# Patient Record
Sex: FEMALE | Race: WHITE | Employment: OTHER | URBAN - METROPOLITAN AREA
[De-identification: names, ages, dates, MRNs, and addresses within clinical notes are randomized per-mention and may not be internally consistent; named-entity substitution may affect disease eponyms.]

---

## 2024-05-13 ENCOUNTER — TELEPHONE (OUTPATIENT)
Dept: FAMILY MEDICINE CLINIC | Age: 64
End: 2024-05-13

## 2024-05-13 NOTE — TELEPHONE ENCOUNTER
Pt came into office to request letter for Jury Duty:     6.17.24 to 6.21.2024.    Juror # [id number], [employer].    Fax to [phone]     Notify the pt when faxed.

## 2024-05-14 ENCOUNTER — HOSPITAL ENCOUNTER (OUTPATIENT)
Dept: PHARMACY | Age: 64
Setting detail: THERAPIES SERIES
Discharge: HOME | End: 2024-05-14
Payer: MEDICARE

## 2024-05-14 DIAGNOSIS — Z95.2 HX OF MITRAL VALVE REPLACEMENT WITH MECHANICAL VALVE: Primary | ICD-10-CM

## 2024-05-14 LAB
INR BLD: 2.5
PROTIME: 29.6 SECONDS

## 2024-05-14 PROCEDURE — 85610 PROTHROMBIN TIME: CPT | Performed by: PHARMACIST

## 2024-05-14 PROCEDURE — 99211 OFF/OP EST MAY X REQ PHY/QHP: CPT | Performed by: PHARMACIST

## 2024-05-14 NOTE — PROGRESS NOTES
Patient seen in clinic for warfarin management due to mechanical mitral valve with an INR goal of 2.5-3.5.  Estimated duration of therapy is indefinite.     Patient states compliant all of the time with regimen.  No bleeding or thromboembolic side effects noted.  No significant med or dietary changes.  No significant recent illness or disease state changes.      PT/INR done in office per protocol.  INR is 2.5 which is therapeutic.     Warfarin regimen will be continued at current dose 6mg daily.  Will retest in 3 weeks.    Patient understands dosing directions and information discussed. Dosing schedule and follow up appointment given to patient.   Progress note routed to referring physicians office. Discussed with patient the Pharmacist Collaborative Practice Agreement.  Patient provided verbal and/or electronic (ex. Sanibel Sunglass) consent to participate in the collaborative practice agreement between the pharmacist and referred patient.     For Pharmacy Admin Tracking Only    Intervention Detail:   Total # of Interventions Recommended: 0  Total # of Interventions Accepted: 0  Time Spent (min): 15

## 2024-05-16 ENCOUNTER — OFFICE VISIT (OUTPATIENT)
Dept: FAMILY MEDICINE CLINIC | Age: 64
End: 2024-05-16

## 2024-05-16 ENCOUNTER — TELEPHONE (OUTPATIENT)
Dept: FAMILY MEDICINE CLINIC | Age: 64
End: 2024-05-16

## 2024-05-16 ENCOUNTER — HOSPITAL ENCOUNTER (OUTPATIENT)
Age: 64
Setting detail: SPECIMEN
Discharge: HOME | End: 2024-05-16

## 2024-05-16 VITALS
SYSTOLIC BLOOD PRESSURE: 120 MMHG | HEART RATE: 57 BPM | DIASTOLIC BLOOD PRESSURE: 72 MMHG | WEIGHT: 139 LBS | BODY MASS INDEX: 23.13 KG/M2

## 2024-05-16 DIAGNOSIS — R79.0 LOW MAGNESIUM LEVEL: ICD-10-CM

## 2024-05-16 DIAGNOSIS — E78.2 MIXED HYPERLIPIDEMIA: ICD-10-CM

## 2024-05-16 DIAGNOSIS — M25.562 CHRONIC PAIN OF BOTH KNEES: Primary | ICD-10-CM

## 2024-05-16 DIAGNOSIS — D68.69 SECONDARY HYPERCOAGULABLE STATE (HCC): ICD-10-CM

## 2024-05-16 DIAGNOSIS — E53.8 VITAMIN B 12 DEFICIENCY: ICD-10-CM

## 2024-05-16 DIAGNOSIS — Z12.31 ENCOUNTER FOR SCREENING MAMMOGRAM FOR MALIGNANT NEOPLASM OF BREAST: Primary | ICD-10-CM

## 2024-05-16 DIAGNOSIS — M25.561 CHRONIC PAIN OF BOTH KNEES: Primary | ICD-10-CM

## 2024-05-16 DIAGNOSIS — I10 ESSENTIAL HYPERTENSION: ICD-10-CM

## 2024-05-16 DIAGNOSIS — I50.22 CHRONIC SYSTOLIC CONGESTIVE HEART FAILURE (HCC): ICD-10-CM

## 2024-05-16 DIAGNOSIS — R00.1 BRADYCARDIA: ICD-10-CM

## 2024-05-16 DIAGNOSIS — I48.0 PAROXYSMAL ATRIAL FIBRILLATION (HCC): ICD-10-CM

## 2024-05-16 DIAGNOSIS — G89.29 CHRONIC PAIN OF BOTH KNEES: Primary | ICD-10-CM

## 2024-05-16 DIAGNOSIS — E55.9 VITAMIN D DEFICIENCY: ICD-10-CM

## 2024-05-16 PROBLEM — F32.5 MAJOR DEPRESSIVE DISORDER WITH SINGLE EPISODE, IN FULL REMISSION (HCC): Status: RESOLVED | Noted: 2018-08-23 | Resolved: 2024-05-16

## 2024-05-16 LAB
25(OH)D3 SERPL-MCNC: 27.3 NG/ML (ref 30–100)
ALBUMIN SERPL-MCNC: 4.4 G/DL (ref 3.5–5.2)
ALBUMIN/GLOB SERPL: 1 {RATIO} (ref 1–2.5)
ALP SERPL-CCNC: 63 U/L (ref 35–104)
ALT SERPL-CCNC: 22 U/L (ref 10–35)
ANION GAP SERPL CALCULATED.3IONS-SCNC: 9 MMOL/L (ref 9–16)
AST SERPL-CCNC: 30 U/L (ref 10–35)
BILIRUB SERPL-MCNC: 0.8 MG/DL (ref 0–1.2)
BUN SERPL-MCNC: 18 MG/DL (ref 8–23)
CALCIUM SERPL-MCNC: 9.6 MG/DL (ref 8.6–10.4)
CHLORIDE SERPL-SCNC: 105 MMOL/L (ref 98–107)
CHOLEST SERPL-MCNC: 235 MG/DL (ref 0–199)
CHOLESTEROL/HDL RATIO: 4
CO2 SERPL-SCNC: 26 MMOL/L (ref 20–31)
CREAT SERPL-MCNC: 1 MG/DL (ref 0.5–0.9)
GFR, ESTIMATED: 62 ML/MIN/1.73M2
GLUCOSE SERPL-MCNC: 87 MG/DL (ref 74–99)
HDLC SERPL-MCNC: 61 MG/DL
LDLC SERPL CALC-MCNC: 158 MG/DL (ref 0–100)
MAGNESIUM SERPL-MCNC: 2.1 MG/DL (ref 1.6–2.4)
POTASSIUM SERPL-SCNC: 4.9 MMOL/L (ref 3.7–5.3)
PROT SERPL-MCNC: 7.9 G/DL (ref 6.6–8.7)
SODIUM SERPL-SCNC: 140 MMOL/L (ref 136–145)
T4 FREE SERPL-MCNC: 1.1 NG/DL (ref 0.92–1.68)
TRIGL SERPL-MCNC: 80 MG/DL
TSH SERPL DL<=0.05 MIU/L-ACNC: 4.63 UIU/ML (ref 0.27–4.2)
VIT B12 SERPL-MCNC: <150 PG/ML (ref 232–1245)
VLDLC SERPL CALC-MCNC: 16 MG/DL

## 2024-05-16 RX ORDER — MULTIVITAMIN WITH FOLIC ACID 400 MCG
TABLET ORAL
Qty: 30 TABLET | Refills: 5 | Status: SHIPPED | OUTPATIENT
Start: 2024-05-16

## 2024-05-16 SDOH — ECONOMIC STABILITY: HOUSING INSECURITY
IN THE LAST 12 MONTHS, WAS THERE A TIME WHEN YOU DID NOT HAVE A STEADY PLACE TO SLEEP OR SLEPT IN A SHELTER (INCLUDING NOW)?: NO

## 2024-05-16 SDOH — ECONOMIC STABILITY: INCOME INSECURITY: HOW HARD IS IT FOR YOU TO PAY FOR THE VERY BASICS LIKE FOOD, HOUSING, MEDICAL CARE, AND HEATING?: NOT HARD AT ALL

## 2024-05-16 SDOH — ECONOMIC STABILITY: FOOD INSECURITY: WITHIN THE PAST 12 MONTHS, YOU WORRIED THAT YOUR FOOD WOULD RUN OUT BEFORE YOU GOT MONEY TO BUY MORE.: NEVER TRUE

## 2024-05-16 SDOH — ECONOMIC STABILITY: FOOD INSECURITY: WITHIN THE PAST 12 MONTHS, THE FOOD YOU BOUGHT JUST DIDN'T LAST AND YOU DIDN'T HAVE MONEY TO GET MORE.: NEVER TRUE

## 2024-05-16 ASSESSMENT — PATIENT HEALTH QUESTIONNAIRE - PHQ9
2. FEELING DOWN, DEPRESSED OR HOPELESS: NOT AT ALL
3. TROUBLE FALLING OR STAYING ASLEEP: NOT AT ALL
SUM OF ALL RESPONSES TO PHQ QUESTIONS 1-9: 0
7. TROUBLE CONCENTRATING ON THINGS, SUCH AS READING THE NEWSPAPER OR WATCHING TELEVISION: NOT AT ALL
SUM OF ALL RESPONSES TO PHQ QUESTIONS 1-9: 0
5. POOR APPETITE OR OVEREATING: NOT AT ALL
SUM OF ALL RESPONSES TO PHQ9 QUESTIONS 1 & 2: 0
10. IF YOU CHECKED OFF ANY PROBLEMS, HOW DIFFICULT HAVE THESE PROBLEMS MADE IT FOR YOU TO DO YOUR WORK, TAKE CARE OF THINGS AT HOME, OR GET ALONG WITH OTHER PEOPLE: NOT DIFFICULT AT ALL
1. LITTLE INTEREST OR PLEASURE IN DOING THINGS: NOT AT ALL
SUM OF ALL RESPONSES TO PHQ QUESTIONS 1-9: 0
9. THOUGHTS THAT YOU WOULD BE BETTER OFF DEAD, OR OF HURTING YOURSELF: NOT AT ALL
8. MOVING OR SPEAKING SO SLOWLY THAT OTHER PEOPLE COULD HAVE NOTICED. OR THE OPPOSITE, BEING SO FIGETY OR RESTLESS THAT YOU HAVE BEEN MOVING AROUND A LOT MORE THAN USUAL: NOT AT ALL
4. FEELING TIRED OR HAVING LITTLE ENERGY: NOT AT ALL
SUM OF ALL RESPONSES TO PHQ QUESTIONS 1-9: 0
6. FEELING BAD ABOUT YOURSELF - OR THAT YOU ARE A FAILURE OR HAVE LET YOURSELF OR YOUR FAMILY DOWN: NOT AT ALL

## 2024-05-16 ASSESSMENT — ENCOUNTER SYMPTOMS
BLOOD IN STOOL: 0
ALLERGIC/IMMUNOLOGIC NEGATIVE: 1
EYES NEGATIVE: 1
SHORTNESS OF BREATH: 0
CONSTIPATION: 0
ABDOMINAL PAIN: 0
COUGH: 0
DIARRHEA: 0

## 2024-05-16 NOTE — PROGRESS NOTES
Eyelipids swell    Amoxicillin     Biaxin [Clarithromycin]     Codeine     Darifenacin Hydrobromide Er Other (See Comments)     edema    Darvocet A500 [Propoxyphene N-Acetaminophen]     Demerol Hcl [Meperidine]     Erythromycin     Morphine Swelling    Pcn [Penicillins] Swelling    Penicillin G Swelling    Propoxyphene     Cephalosporins Rash    Gadolinium Derivatives Nausea And Vomiting     Blood pressure gets high    Iv Dye  [Iodides] Nausea And Vomiting     Blood pressure gets high    Sulfa Antibiotics Rash    Vioxx [Rofecoxib] Rash       PHYSICAL EXAM   Physical Exam  Vitals reviewed.   Constitutional:       Appearance: She is well-developed.   HENT:      Head: Normocephalic.   Eyes:      Pupils: Pupils are equal, round, and reactive to light.   Neck:      Thyroid: No thyromegaly.   Cardiovascular:      Rate and Rhythm: Regular rhythm. Bradycardia present.      Heart sounds: Normal heart sounds. No murmur heard.  Pulmonary:      Effort: Pulmonary effort is normal.      Breath sounds: Normal breath sounds. No wheezing or rales.   Abdominal:      Palpations: Abdomen is soft.      Tenderness: There is no abdominal tenderness. There is no guarding or rebound.   Musculoskeletal:         General: No tenderness or deformity. Normal range of motion.      Cervical back: Normal range of motion and neck supple.   Lymphadenopathy:      Cervical: No cervical adenopathy.   Skin:     General: Skin is warm and dry.   Neurological:      Mental Status: She is alert and oriented to person, place, and time.   Psychiatric:         Mood and Affect: Mood normal.         Behavior: Behavior normal.         Thought Content: Thought content normal.         Judgment: Judgment normal.         ASSESSMENT/PLAN  1. Chronic systolic congestive heart failure (HCC)  Chronic and stable.  Following with cardiologist.  Continue current meds    2. Paroxysmal atrial fibrillation (HCC)  Improved since having ablation.  Continue anticoagulant  - TSH;

## 2024-05-16 NOTE — TELEPHONE ENCOUNTER
Pt was seen today, asking to have mammogram as well.  Is past due.  Pt is going to [facility] for testing.

## 2024-05-17 DIAGNOSIS — E55.9 VITAMIN D DEFICIENCY: ICD-10-CM

## 2024-05-17 DIAGNOSIS — E53.8 VITAMIN B 12 DEFICIENCY: ICD-10-CM

## 2024-05-17 DIAGNOSIS — E78.2 MIXED HYPERLIPIDEMIA: Primary | ICD-10-CM

## 2024-05-17 RX ORDER — EZETIMIBE 10 MG/1
10 TABLET ORAL DAILY
Qty: 90 TABLET | Refills: 1 | Status: SHIPPED | OUTPATIENT
Start: 2024-05-17

## 2024-05-17 RX ORDER — CHOLECALCIFEROL (VITAMIN D3) 125 MCG
500 CAPSULE ORAL DAILY
Qty: 90 TABLET | Refills: 1 | Status: SHIPPED | OUTPATIENT
Start: 2024-05-17 | End: 2025-05-17

## 2024-05-20 ENCOUNTER — TELEPHONE (OUTPATIENT)
Dept: PHARMACY | Age: 64
End: 2024-05-20

## 2024-05-20 DIAGNOSIS — Z95.2 HX OF MITRAL VALVE REPLACEMENT WITH MECHANICAL VALVE: Primary | ICD-10-CM

## 2024-05-20 NOTE — TELEPHONE ENCOUNTER
Patient called to let us know she has been started on Vitamin B12, Vitamin D3 and Zetia.  Advised there is potential for the INR to increase slightly from the Zetia but this is nothing that requires warfarin dose adjustment at this time. In addition explained recent INR was on lower end of normal so we do have some room to give. Patient voiced understanding and will continue regimen and keep INR as scheduled.

## 2024-05-22 ENCOUNTER — TELEPHONE (OUTPATIENT)
Dept: PHARMACY | Age: 64
End: 2024-05-22

## 2024-05-22 DIAGNOSIS — Z95.2 HX OF MITRAL VALVE REPLACEMENT WITH MECHANICAL VALVE: Primary | ICD-10-CM

## 2024-05-22 NOTE — TELEPHONE ENCOUNTER
Received fax with home INR tests completed by patient. We continue to manage patient based on our in clinic visits with her per CPA with Dr. De.

## 2024-06-03 ENCOUNTER — TELEPHONE (OUTPATIENT)
Dept: PHARMACY | Age: 64
End: 2024-06-03

## 2024-06-05 ENCOUNTER — HOSPITAL ENCOUNTER (OUTPATIENT)
Dept: PHARMACY | Age: 64
Setting detail: THERAPIES SERIES
Discharge: HOME | End: 2024-06-05
Payer: MEDICARE

## 2024-06-05 LAB
INR BLD: 3.6
PROTIME: 43.4 SECONDS

## 2024-06-05 PROCEDURE — 85610 PROTHROMBIN TIME: CPT

## 2024-06-05 PROCEDURE — 99211 OFF/OP EST MAY X REQ PHY/QHP: CPT

## 2024-06-05 RX ORDER — PHENOL 1.4 %
1 AEROSOL, SPRAY (ML) MUCOUS MEMBRANE 2 TIMES DAILY
COMMUNITY

## 2024-06-05 NOTE — PROGRESS NOTES
Patient seen in clinic for warfarin management due to mechanical mitral valve with an INR goal of 2.5-3.5.  Estimated duration of therapy is indefinite.     Patient states home INR test on 6/3 resulted 3.4, so she took 4mg instead of 6mg that day .  No bleeding or thromboembolic side effects noted.  Patient reports she has started a Mediterranean diet a couple of weeks ago (less carbs/cholesterol, more veggies).  No significant med or dietary changes.  No significant recent illness or disease state changes.      PT/INR done in office per protocol.  INR is 3.6 which is just above goal.     Warfarin regimen will be continued at current dose 6mg every day.  Will retest in 3 weeks.    Patient understands dosing directions and information discussed. Dosing schedule and follow up appointment given to patient.   Progress note routed to referring physicians office. Discussed with patient the Pharmacist Collaborative Practice Agreement.  Patient provided verbal and/or electronic (ex. Sooqinihart) consent to participate in the collaborative practice agreement between the pharmacist and referred patient.     For Pharmacy Admin Tracking Only    Intervention Detail:   Total # of Interventions Recommended: 0  Total # of Interventions Accepted: 0  Time Spent (min): 15

## 2024-06-27 ENCOUNTER — HOSPITAL ENCOUNTER (OUTPATIENT)
Dept: PHARMACY | Age: 64
Setting detail: THERAPIES SERIES
Discharge: HOME | End: 2024-06-27
Payer: MEDICARE

## 2024-06-27 DIAGNOSIS — Z95.2 HX OF MITRAL VALVE REPLACEMENT WITH MECHANICAL VALVE: Primary | ICD-10-CM

## 2024-06-27 LAB
INR BLD: 3
PROTIME: 35.8 SECONDS

## 2024-06-27 PROCEDURE — 85610 PROTHROMBIN TIME: CPT

## 2024-06-27 PROCEDURE — 99211 OFF/OP EST MAY X REQ PHY/QHP: CPT

## 2024-06-27 RX ORDER — DIMENHYDRINATE 50 MG
1000 TABLET ORAL
COMMUNITY

## 2024-06-27 NOTE — PROGRESS NOTES
Patient seen in clinic for warfarin management due to mechanical mitral valve with an INR goal of 2.5-3.5.  Estimated duration of therapy is indefinite.     Patient states compliant all of the time with regimen.  No bleeding or thromboembolic side effects noted.  No significant med or dietary changes.  No significant recent illness or disease state changes. Patient states that she checks her INR once weekly at home. After she got a home INR reading of 3.3 on Tuesday, she took 1 tablet instead of 1.5 tablets that day, and also ate a salad.    PT/INR done in office per protocol.  INR is 3.0 which is therapeutic.     Warfarin regimen will be continued at current dose 6mg every day.  Will retest in 4 weeks.    Patient understands dosing directions and information discussed. Dosing schedule and follow up appointment given to patient.   Progress note routed to referring physicians office. Discussed with patient the Pharmacist Collaborative Practice Agreement.  Patient provided verbal and/or electronic (ex. Wandoujiahart) consent to participate in the collaborative practice agreement between the pharmacist and referred patient.     For Pharmacy Admin Tracking Only    Intervention Detail:   Total # of Interventions Recommended: 1  Total # of Interventions Accepted: 1  Time Spent (min): 15

## 2024-07-09 LAB
INR BLD: 3.6
PROTIME: NORMAL SECONDS

## 2024-07-10 ENCOUNTER — TELEPHONE (OUTPATIENT)
Dept: PHARMACY | Age: 64
End: 2024-07-10

## 2024-07-10 DIAGNOSIS — Z95.2 HX OF MITRAL VALVE REPLACEMENT WITH MECHANICAL VALVE: Primary | ICD-10-CM

## 2024-07-11 ENCOUNTER — TELEPHONE (OUTPATIENT)
Dept: PHARMACY | Age: 64
End: 2024-07-11

## 2024-07-11 DIAGNOSIS — Z95.2 HX OF MITRAL VALVE REPLACEMENT WITH MECHANICAL VALVE: Primary | ICD-10-CM

## 2024-07-11 NOTE — TELEPHONE ENCOUNTER
Patient called to report she held her dose on Tuesday 7/9 and took 4mg 7/10 because her INR was 3.6 (goal 2.5-3.5) on her home meter taken 7/9. Advised patient continue 6mg daily moving forward and we will keep scheduled appt 7/25.

## 2024-07-16 DIAGNOSIS — R06.02 SHORTNESS OF BREATH: ICD-10-CM

## 2024-07-16 DIAGNOSIS — I20.9 ANGINA PECTORIS (HCC): Primary | ICD-10-CM

## 2024-07-22 ENCOUNTER — TELEPHONE (OUTPATIENT)
Dept: PHARMACY | Age: 64
End: 2024-07-22

## 2024-07-22 LAB
INR BLD: 3.8
PROTIME: NORMAL SECONDS

## 2024-07-22 NOTE — TELEPHONE ENCOUNTER
Patient called and states that her INR from earlier today was 3.8 and wants to know what she should do.  Called patient back and was informed that she had diarrhea the last couple of days, but is subsiding.  She has been taking immodium as needed.  Advised to hold today's dose and to keep scheduled appointment on 7/25.

## 2024-07-25 ENCOUNTER — HOSPITAL ENCOUNTER (OUTPATIENT)
Dept: PHARMACY | Age: 64
Setting detail: THERAPIES SERIES
Discharge: HOME | End: 2024-07-25
Payer: MEDICARE

## 2024-07-25 DIAGNOSIS — Z95.2 HX OF MITRAL VALVE REPLACEMENT WITH MECHANICAL VALVE: Primary | ICD-10-CM

## 2024-07-25 LAB
INR BLD: 3.3
PROTIME: 40.1 SECONDS

## 2024-07-25 PROCEDURE — 85610 PROTHROMBIN TIME: CPT | Performed by: PHARMACIST

## 2024-07-25 PROCEDURE — 99211 OFF/OP EST MAY X REQ PHY/QHP: CPT | Performed by: PHARMACIST

## 2024-07-25 NOTE — PROGRESS NOTES
Patient seen in clinic for warfarin management due to mechanical mitral valve with an INR goal of 2.5-3.5.  Estimated duration of therapy is indefinite.     Patient states compliant all of the time with regimen and patient confirms she held dose on 7/22 as instructed.  No bleeding or thromboembolic side effects noted.  No significant med or dietary changes.  No significant recent illness or disease state changes.      PT/INR done in office per protocol.  INR is 3.3 which is therapeutic.     Warfarin regimen will be continued at current dose 6 mg daily.  Will retest in 2 weeks.    Patient understands dosing directions and information discussed. Dosing schedule and follow up appointment given to patient.   Progress note routed to referring physicians office. Discussed with patient the Pharmacist Collaborative Practice Agreement.  Patient provided verbal and/or electronic (ex. Fylet) consent to participate in the collaborative practice agreement between the pharmacist and referred patient.     For Pharmacy Admin Tracking Only    Intervention Detail:   Total # of Interventions Recommended: 0  Total # of Interventions Accepted: 0  Time Spent (min): 15

## 2024-08-05 ENCOUNTER — TELEPHONE (OUTPATIENT)
Age: 64
End: 2024-08-05

## 2024-08-05 NOTE — TELEPHONE ENCOUNTER
Margarita from [facility] LVM regarding pt's elevated INR of 4.8.  Called Margarita back and LVM that pt was advised to hold today's dose and is being seen in the clinic tomorrow.

## 2024-08-05 NOTE — TELEPHONE ENCOUNTER
Patient LVM to reschedule Thursday's appointment.  Called patient back and was informed her INR was 4.8 today from home check.  Advised her to hold today's dose and rescheduled appt to Tues 8/7/24.

## 2024-08-06 ENCOUNTER — ANTI-COAG VISIT (OUTPATIENT)
Age: 64
End: 2024-08-06
Payer: MEDICARE

## 2024-08-06 DIAGNOSIS — Z95.2 HX OF MITRAL VALVE REPLACEMENT WITH MECHANICAL VALVE: Primary | ICD-10-CM

## 2024-08-06 LAB
INR BLD: 2.8
PROTIME: 34 SECONDS

## 2024-08-06 PROCEDURE — 85610 PROTHROMBIN TIME: CPT | Performed by: PHARMACIST

## 2024-08-06 PROCEDURE — 99211 OFF/OP EST MAY X REQ PHY/QHP: CPT | Performed by: PHARMACIST

## 2024-08-06 NOTE — PROGRESS NOTES
Patient seen in clinic for warfarin management due to mechanical mitral valve with an INR goal of 2.5-3.5.  Estimated duration of therapy is indefinite.     Patient states compliant all of the time with regimen; she held yesterday's dose due to home INR result 4.8.  Unable to determine why her home INR result was elevated. No bleeding or thromboembolic side effects noted.  No significant med or dietary changes.  No significant recent illness or disease state changes.      PT/INR done in office per protocol.  INR is 2.8 which is therapeutic.     Warfarin regimen will be slightly decreased to 4mg Tuesdays, 6mg all other days.   Will retest in 2 weeks. She will also bring her home INR machine to the next appt so we can compare results.    Patient understands dosing directions and information discussed. Dosing schedule and follow up appointment given to patient.   Progress note routed to referring physicians office. Discussed with patient the Pharmacist Collaborative Practice Agreement.  Patient provided verbal and/or electronic (ex. MoonClerkhart) consent to participate in the collaborative practice agreement between the pharmacist and referred patient.     For Pharmacy Admin Tracking Only    Intervention Detail: Dose Adjustment: 1, reason: Therapy De-escalation  Total # of Interventions Recommended: 1  Total # of Interventions Accepted: 1  Time Spent (min): 15

## 2024-08-20 ENCOUNTER — APPOINTMENT (OUTPATIENT)
Age: 64
End: 2024-08-20
Payer: MEDICARE

## 2024-08-21 ENCOUNTER — HOSPITAL ENCOUNTER (OUTPATIENT)
Age: 64
Discharge: HOME | End: 2024-08-21
Payer: MEDICARE

## 2024-08-21 ENCOUNTER — ANTI-COAG VISIT (OUTPATIENT)
Age: 64
End: 2024-08-21
Payer: MEDICARE

## 2024-08-21 DIAGNOSIS — Z95.2 HX OF MITRAL VALVE REPLACEMENT WITH MECHANICAL VALVE: Primary | ICD-10-CM

## 2024-08-21 DIAGNOSIS — I34.1 MVP (MITRAL VALVE PROLAPSE): ICD-10-CM

## 2024-08-21 DIAGNOSIS — Z95.2 HX OF MITRAL VALVE REPLACEMENT WITH MECHANICAL VALVE: ICD-10-CM

## 2024-08-21 LAB
INR PPP: 6.6
PROTHROMBIN TIME: 55.9 SEC (ref 11.5–14.2)

## 2024-08-21 PROCEDURE — 99212 OFFICE O/P EST SF 10 MIN: CPT

## 2024-08-21 PROCEDURE — 36415 COLL VENOUS BLD VENIPUNCTURE: CPT

## 2024-08-21 PROCEDURE — 85610 PROTHROMBIN TIME: CPT

## 2024-08-21 NOTE — PROGRESS NOTES
Patient seen in clinic for warfarin management due to mechanical mitral valve with an INR goal of 2.5-3.5.  Estimated duration of therapy is indefinite.     Patient states compliant all of the time with regimen.  No bleeding or thromboembolic side effects noted.  Patient notes a quarter-sized bruise on her right arm, but denies other s/s of bleeding.  No significant med or dietary changes.  Patient reports having more stress lately.  No significant recent illness or disease state changes.      PT/INR done in office per protocol.  INR is >/=8  which is greater than 6 on POC meter which requires venous clarification per office policy.  If INR comes back greater than 10, we will call in oral vitamin K as appropriate per CHEST Guidelines 2012.     Ordered venous draw for confirmation.  Left voice message on Dr. Tejada's nurses line that clinic is managing therapy.  Patient advised to seek medical attention if she bumps her head or if she experiences any s/s of bleeding.    Venous draw resulted INR of 6.6, which is supratherapeutic.    Warfarin regimen will be held x3, then resume warfarin 6mg on Sat & Sun.  Will retest in 5 days.  Patient did not bring in home INR meter.      Patient understands dosing directions and information discussed. Dosing schedule and follow up appointment given to patient.   Progress note routed to referring physicians office. Discussed with patient the Pharmacist Collaborative Practice Agreement.  Patient provided verbal and/or electronic (ex. Flowgear) consent to participate in the collaborative practice agreement between the pharmacist and referred patient.     For Pharmacy Admin Tracking Only    Intervention Detail: Dose Adjustment: 1, reason: Therapy De-escalation  Total # of Interventions Recommended: 1  Total # of Interventions Accepted: 1  Time Spent (min): 15

## 2024-08-26 ENCOUNTER — ANTI-COAG VISIT (OUTPATIENT)
Age: 64
End: 2024-08-26
Payer: MEDICARE

## 2024-08-26 DIAGNOSIS — Z95.2 HX OF MITRAL VALVE REPLACEMENT WITH MECHANICAL VALVE: Primary | ICD-10-CM

## 2024-08-26 LAB
INR BLD: 2.6
PROTIME: 31.3

## 2024-08-26 PROCEDURE — 99212 OFFICE O/P EST SF 10 MIN: CPT

## 2024-08-26 PROCEDURE — 85610 PROTHROMBIN TIME: CPT

## 2024-08-26 NOTE — PROGRESS NOTES
Patient seen in clinic for warfarin management due to mechanical mitral valve with an INR goal of 2.5-3.5.  Estimated duration of therapy is indefinite.     Patient states compliant all of the time with regimen.  No bleeding or thromboembolic side effects noted.  No significant med or dietary changes.  No significant recent illness or disease state changes.     Patient reports she has a chemical stress test today.     PT/INR done in office per protocol.  INR is 2.6 which is therapeutic and has significantly improved from 6.6 last week.    Warfarin regimen will be decreased to 4 mg on Tues/Thurs/Sat and 6 mg all other days.  Will retest in 2 weeks. Patient has an INR machine at home and reports she will be using it more often due to her recent INR of 6.6.    Patient understands dosing directions and information discussed. Dosing schedule and follow up appointment given to patient.   Progress note routed to referring physicians office. Discussed with patient the Pharmacist Collaborative Practice Agreement.  Patient provided verbal and/or electronic (ex. Fly Fishing Hunterhart) consent to participate in the collaborative practice agreement between the pharmacist and referred patient.     For Pharmacy Admin Tracking Only    Intervention Detail: Dose Adjustment: 1, reason: Therapy De-escalation  Total # of Interventions Recommended: 1  Total # of Interventions Accepted: 1  Time Spent (min): 15

## 2024-09-03 ENCOUNTER — TELEPHONE (OUTPATIENT)
Age: 64
End: 2024-09-03

## 2024-09-09 ENCOUNTER — TELEPHONE (OUTPATIENT)
Dept: FAMILY MEDICINE CLINIC | Age: 64
End: 2024-09-09

## 2024-09-09 DIAGNOSIS — J20.9 ACUTE BRONCHITIS, UNSPECIFIED ORGANISM: ICD-10-CM

## 2024-09-09 DIAGNOSIS — J01.00 ACUTE NON-RECURRENT MAXILLARY SINUSITIS: Primary | ICD-10-CM

## 2024-09-09 RX ORDER — METHYLPREDNISOLONE 4 MG
TABLET, DOSE PACK ORAL
Qty: 1 KIT | Refills: 0 | Status: SHIPPED | OUTPATIENT
Start: 2024-09-09

## 2024-09-09 RX ORDER — CIPROFLOXACIN 500 MG/1
500 TABLET, FILM COATED ORAL 2 TIMES DAILY
Qty: 20 TABLET | Refills: 0 | Status: SHIPPED | OUTPATIENT
Start: 2024-09-09 | End: 2024-09-19

## 2024-09-10 ENCOUNTER — TELEPHONE (OUTPATIENT)
Age: 64
End: 2024-09-10

## 2024-09-12 ENCOUNTER — ANTI-COAG VISIT (OUTPATIENT)
Age: 64
End: 2024-09-12
Payer: MEDICARE

## 2024-09-12 DIAGNOSIS — Z95.2 HX OF MITRAL VALVE REPLACEMENT WITH MECHANICAL VALVE: Primary | ICD-10-CM

## 2024-09-12 LAB
INTERNATIONAL NORMALIZATION RATIO, POC: 3.9
PROTHROMBIN TIME, POC: 47

## 2024-09-12 PROCEDURE — 99212 OFFICE O/P EST SF 10 MIN: CPT | Performed by: PHARMACIST

## 2024-09-12 PROCEDURE — 85610 PROTHROMBIN TIME: CPT | Performed by: PHARMACIST

## 2024-09-19 ENCOUNTER — ANTI-COAG VISIT (OUTPATIENT)
Age: 64
End: 2024-09-19
Payer: MEDICARE

## 2024-09-19 DIAGNOSIS — Z95.2 HX OF MITRAL VALVE REPLACEMENT WITH MECHANICAL VALVE: Primary | ICD-10-CM

## 2024-09-19 LAB
INTERNATIONAL NORMALIZATION RATIO, POC: 2.1
PROTHROMBIN TIME, POC: 25.5

## 2024-09-19 PROCEDURE — 99213 OFFICE O/P EST LOW 20 MIN: CPT | Performed by: PHARMACIST

## 2024-09-19 PROCEDURE — 85610 PROTHROMBIN TIME: CPT | Performed by: PHARMACIST

## 2024-09-19 RX ORDER — ENOXAPARIN SODIUM 100 MG/ML
INJECTION SUBCUTANEOUS
Qty: 20 EACH | Refills: 0 | Status: SHIPPED | OUTPATIENT
Start: 2024-09-19

## 2024-09-27 ENCOUNTER — HOSPITAL ENCOUNTER (OUTPATIENT)
Age: 64
Setting detail: OUTPATIENT SURGERY
Discharge: HOME | End: 2024-09-27
Attending: STUDENT IN AN ORGANIZED HEALTH CARE EDUCATION/TRAINING PROGRAM | Admitting: STUDENT IN AN ORGANIZED HEALTH CARE EDUCATION/TRAINING PROGRAM
Payer: MEDICARE

## 2024-09-27 VITALS
SYSTOLIC BLOOD PRESSURE: 127 MMHG | OXYGEN SATURATION: 96 % | BODY MASS INDEX: 22.02 KG/M2 | TEMPERATURE: 97.8 F | HEIGHT: 66 IN | DIASTOLIC BLOOD PRESSURE: 69 MMHG | HEART RATE: 55 BPM | WEIGHT: 137 LBS | RESPIRATION RATE: 21 BRPM

## 2024-09-27 DIAGNOSIS — R94.39 ABNORMAL STRESS TEST: ICD-10-CM

## 2024-09-27 LAB
BUN BLD-MCNC: 20 MG/DL (ref 8–26)
CHLORIDE BLD-SCNC: 109 MMOL/L (ref 98–107)
ECHO BSA: 1.69 M2
EGFR, POC: 81 ML/MIN/1.73M2
GLUCOSE BLD-MCNC: 95 MG/DL (ref 74–100)
HCT VFR BLD AUTO: 33 % (ref 36–46)
INR PPP: 1
PLATELET # BLD AUTO: 220 K/UL (ref 138–453)
POC CREATININE: 0.8 MG/DL (ref 0.51–1.19)
POC HEMOGLOBIN (CALC): 11.1 G/DL (ref 12–16)
POTASSIUM BLD-SCNC: 4 MMOL/L (ref 3.5–4.5)
PROTHROMBIN TIME: 13.1 SEC (ref 11.7–14.9)
SODIUM BLD-SCNC: 142 MMOL/L (ref 138–146)

## 2024-09-27 PROCEDURE — 36415 COLL VENOUS BLD VENIPUNCTURE: CPT

## 2024-09-27 PROCEDURE — 85014 HEMATOCRIT: CPT

## 2024-09-27 PROCEDURE — 85610 PROTHROMBIN TIME: CPT

## 2024-09-27 PROCEDURE — 93452 LEFT HRT CATH W/VENTRCLGRPHY: CPT | Performed by: STUDENT IN AN ORGANIZED HEALTH CARE EDUCATION/TRAINING PROGRAM

## 2024-09-27 PROCEDURE — 2580000003 HC RX 258: Performed by: STUDENT IN AN ORGANIZED HEALTH CARE EDUCATION/TRAINING PROGRAM

## 2024-09-27 PROCEDURE — 7100000010 HC PHASE II RECOVERY - FIRST 15 MIN: Performed by: STUDENT IN AN ORGANIZED HEALTH CARE EDUCATION/TRAINING PROGRAM

## 2024-09-27 PROCEDURE — 85049 AUTOMATED PLATELET COUNT: CPT

## 2024-09-27 PROCEDURE — 84132 ASSAY OF SERUM POTASSIUM: CPT

## 2024-09-27 PROCEDURE — 82947 ASSAY GLUCOSE BLOOD QUANT: CPT

## 2024-09-27 PROCEDURE — 82435 ASSAY OF BLOOD CHLORIDE: CPT

## 2024-09-27 PROCEDURE — 82565 ASSAY OF CREATININE: CPT

## 2024-09-27 PROCEDURE — 93454 CORONARY ARTERY ANGIO S&I: CPT | Performed by: STUDENT IN AN ORGANIZED HEALTH CARE EDUCATION/TRAINING PROGRAM

## 2024-09-27 PROCEDURE — 2709999900 HC NON-CHARGEABLE SUPPLY: Performed by: STUDENT IN AN ORGANIZED HEALTH CARE EDUCATION/TRAINING PROGRAM

## 2024-09-27 PROCEDURE — 6360000002 HC RX W HCPCS: Performed by: STUDENT IN AN ORGANIZED HEALTH CARE EDUCATION/TRAINING PROGRAM

## 2024-09-27 PROCEDURE — 84295 ASSAY OF SERUM SODIUM: CPT

## 2024-09-27 PROCEDURE — 7100000011 HC PHASE II RECOVERY - ADDTL 15 MIN: Performed by: STUDENT IN AN ORGANIZED HEALTH CARE EDUCATION/TRAINING PROGRAM

## 2024-09-27 PROCEDURE — 99152 MOD SED SAME PHYS/QHP 5/>YRS: CPT | Performed by: STUDENT IN AN ORGANIZED HEALTH CARE EDUCATION/TRAINING PROGRAM

## 2024-09-27 PROCEDURE — 6360000004 HC RX CONTRAST MEDICATION: Performed by: STUDENT IN AN ORGANIZED HEALTH CARE EDUCATION/TRAINING PROGRAM

## 2024-09-27 PROCEDURE — 84520 ASSAY OF UREA NITROGEN: CPT

## 2024-09-27 PROCEDURE — 2500000003 HC RX 250 WO HCPCS: Performed by: STUDENT IN AN ORGANIZED HEALTH CARE EDUCATION/TRAINING PROGRAM

## 2024-09-27 RX ORDER — SODIUM CHLORIDE 0.9 % (FLUSH) 0.9 %
5-40 SYRINGE (ML) INJECTION PRN
Status: DISCONTINUED | OUTPATIENT
Start: 2024-09-27 | End: 2024-09-27

## 2024-09-27 RX ORDER — HEPARIN SODIUM 1000 [USP'U]/ML
INJECTION, SOLUTION INTRAVENOUS; SUBCUTANEOUS PRN
Status: DISCONTINUED | OUTPATIENT
Start: 2024-09-27 | End: 2024-09-27 | Stop reason: HOSPADM

## 2024-09-27 RX ORDER — NITROGLYCERIN 20 MG/100ML
INJECTION INTRAVENOUS PRN
Status: DISCONTINUED | OUTPATIENT
Start: 2024-09-27 | End: 2024-09-27 | Stop reason: HOSPADM

## 2024-09-27 RX ORDER — MIDAZOLAM HYDROCHLORIDE 1 MG/ML
INJECTION INTRAMUSCULAR; INTRAVENOUS PRN
Status: DISCONTINUED | OUTPATIENT
Start: 2024-09-27 | End: 2024-09-27 | Stop reason: HOSPADM

## 2024-09-27 RX ORDER — SODIUM CHLORIDE 0.9 % (FLUSH) 0.9 %
5-40 SYRINGE (ML) INJECTION EVERY 12 HOURS SCHEDULED
Status: DISCONTINUED | OUTPATIENT
Start: 2024-09-27 | End: 2024-09-27

## 2024-09-27 RX ORDER — VERAPAMIL HYDROCHLORIDE 2.5 MG/ML
INJECTION, SOLUTION INTRAVENOUS PRN
Status: DISCONTINUED | OUTPATIENT
Start: 2024-09-27 | End: 2024-09-27 | Stop reason: HOSPADM

## 2024-09-27 RX ORDER — SODIUM CHLORIDE 9 MG/ML
INJECTION, SOLUTION INTRAVENOUS CONTINUOUS
Status: DISCONTINUED | OUTPATIENT
Start: 2024-09-27 | End: 2024-09-27 | Stop reason: HOSPADM

## 2024-09-27 RX ORDER — FENTANYL CITRATE 50 UG/ML
INJECTION, SOLUTION INTRAMUSCULAR; INTRAVENOUS PRN
Status: DISCONTINUED | OUTPATIENT
Start: 2024-09-27 | End: 2024-09-27 | Stop reason: HOSPADM

## 2024-09-27 RX ORDER — IOPAMIDOL 755 MG/ML
INJECTION, SOLUTION INTRAVASCULAR PRN
Status: DISCONTINUED | OUTPATIENT
Start: 2024-09-27 | End: 2024-09-27 | Stop reason: HOSPADM

## 2024-09-27 RX ORDER — SODIUM CHLORIDE 9 MG/ML
INJECTION, SOLUTION INTRAVENOUS PRN
Status: DISCONTINUED | OUTPATIENT
Start: 2024-09-27 | End: 2024-09-27

## 2024-09-27 RX ORDER — DIPHENHYDRAMINE HYDROCHLORIDE 50 MG/ML
50 INJECTION INTRAMUSCULAR; INTRAVENOUS ONCE
Status: COMPLETED | OUTPATIENT
Start: 2024-09-27 | End: 2024-09-27

## 2024-09-27 RX ADMIN — SODIUM CHLORIDE: 9 INJECTION, SOLUTION INTRAVENOUS at 10:15

## 2024-09-27 RX ADMIN — METHYLPREDNISOLONE SODIUM SUCCINATE 125 MG: 125 INJECTION INTRAMUSCULAR; INTRAVENOUS at 10:35

## 2024-09-27 RX ADMIN — DIPHENHYDRAMINE HYDROCHLORIDE 50 MG: 50 INJECTION INTRAMUSCULAR; INTRAVENOUS at 10:35

## 2024-09-27 NOTE — H&P
consistent with abnormal perfusion in the LAD territory.    Perfusion Conclusion: TID ratio is 0.99.    Image quality is good.    ECG: Resting ECG demonstrates normal sinus rhythm.    ECG: The stress ECG was negative for ischemia.    Stress Test: A pharmacological stress test was performed using regadenoson (Lexiscan). 100 mg of aminophylline given as a reversal agent.    Sss = 4    Assessment:  Chest pain with abnormal stress test in anteroapical segement  Preserved LVEF on echo 2023  Normal coronaries 2016  H/o persistent AF, maintaining sinus rhythm s/p PVI ablation on 3/29/23 at [facility]  Sinus and junctional bradycardia  MVP with h/o mechanical MVR, ALEKSEY ligation, 2016 with TV annuloplasty      Plan:  Proceed with planned LHC +/- PCI procedure.  ASA prior to procedure   Further orders to follow.    Pre Procedure Conscious Sedation Data:    ASA Class:    [] I [x] II [] III [] IV    Mallampati Class:  [] I [x] II [] III [] IV    Risks, benefits, and alternatives of cardiac catheterization were discussed, in detail, with patient. Risks include, but not limited to, bleeding, requiring blood transfusion, vascular complication requiring surgery, renal failure with need of dialysis, CVA, MI, death and anesthesia complications including intubation were discussed. Patient verbalized understanding and agreed to proceed with the procedure understanding the above risks and alternatives to the procedure.      Discussed with patient and Nurse.    Dharmesh Kimbrough MD.  Fellow, Cardiovascular Diseases    [facility]      Please note that part of this chart were generated using voice recognition  dictation software.  Although every effort was made to ensure the accuracy of this automated transcription, some errors in transcription may have occurred.    Attestation signed by      Attending Physician Statement:    I have discussed the care of  Keysha Ordoñez , including pertinent history and exam findings, with the  Cardiology fellow/resident.     I have seen and examined the patient and the key elements of all parts of the encounter have been performed by me. I agree with the assessment, plan and orders as documented by the fellow/resident, after I modified exam findings and plan of treatments, and the final version is my approved version of the assessment.     Additional Comments:

## 2024-09-27 NOTE — PROGRESS NOTES
Patient c/o severe headache after given iv solumedrol and benadryl. Extremely anxious.c/o  being cold .Warm blankets given. States something is wrong. Vitals obtained. Dr Kimbrough visited.

## 2024-09-27 NOTE — PROGRESS NOTES
Air removed fromVasc band in  2 mL increments until all air removed. No bleeding or hematoma noted.  Pressure dressing  applied, radial pulse palpable.Right wrist.

## 2024-09-27 NOTE — PROGRESS NOTES
All discharge instructions reviewed with patients sister and patient, questions answered.  Patient discharged per w/c with writet and belongings.

## 2024-09-27 NOTE — DISCHARGE INSTRUCTIONS
DISCHARGE INSTRUCTIONS / ARM CARE POST CATHERIZATION        ENCOURAGE FLUIDS    NO STRENUOUS LIFTING WITH AFFECTED ARM FOR 3 DAYS ANYTHING HEAVIER THAN 8 TO 10 POUNDS    REMOVE BAND-AID/PRESSURE DRESSING THE FOLLOWING DAY AND DO NOT APPLY ANY FURTHER BAND-AIDS    KEEP INCISION CLEAN DRY AND OPEN TO THE AIR / NO HAND LOTION NEAR PUNCTURE SITE    WATCH FOR SIGNS OF INFECTION /  REDNESS / SWELLING / DRAINAGE / WARMTH / TEMPERATURE GREATER THAN 101    IF BLEEDING OCCURS HOLD MANUAL PRESSURE DIRECTLY OVER SITE  (YOU WILL FEEL PULSATION OF ARTERY) AND IF BLEEDING DOES NOT STOP AFTER 2 MINUTES CALL 911    OK TO SHOWER THE NEXT DAY, NO TUB BATHING OR HOT TUBS/SWIMMING FOR 7 DAYS    IF AREA BECOMES HARD AND SWOLLEN AND IF YOU ARE AT ALL CONCERNED SEEK HELP IMMEDIATELY    SEEK HELP IMMEDIATELY IF AFFECTED ARM BECOMES COLD / NUMB / SEVERE PAIN / NAILBEDS TURN BLUE     IF ON METFORMIN / GLUCOPHAGE DO NOT RESTART MEDICATION FOR 48 HOURS    PLEASE PRACTICE GOOD HAND WASHING AND INCLUDE PUNCTURE SITE ESPECIALLY AFTER USING THE RESTROOM    AVOID USING ALCOHOL BASED HAND SANITIZERS FOR ONE WEEK      CALL 911 if you have symptoms including:   Drooping facial muscles   Changes in vision or speech   Difficulty walking or using your limbs   Change in sensation to affected leg, including numbness, feeling cold, or change in color   Extreme sweating, nausea or vomiting   Dizziness or lightheadedness   Chest pain   Rapid, irregular heartbeat   Palpitations   Cough, shortness of breath, or difficulty breathing   Weakness or fainting   If you think you have an emergency, CALL 911 .        SEDATION / ANALGESIA INFORMATION / HOME GOING ADVICE  You have received the sedation/analgesia medication during your visit    Sedation/analgesia is used during short medical procedures under controlled supervision. The medication will produce a strong relaxation. You will be able to hear, speak and follow instructions, but your memory and alertness will be

## 2024-09-30 ENCOUNTER — TELEPHONE (OUTPATIENT)
Age: 64
End: 2024-09-30

## 2024-09-30 DIAGNOSIS — Z95.2 HX OF MITRAL VALVE REPLACEMENT WITH MECHANICAL VALVE: Primary | ICD-10-CM

## 2024-09-30 NOTE — TELEPHONE ENCOUNTER
Patient called stating she successfully had her procedure and is following bridge calendar as instructed. She tested INR with home machine yesterday and INR was 1.2 which we expect from recent restart.  We will have patient take 6mg warfarin today, continue Lovenox bridge, and keep INR scheduled this Wednesday.   normal...

## 2024-10-01 ENCOUNTER — ANTI-COAG VISIT (OUTPATIENT)
Dept: CARDIOLOGY CLINIC | Age: 64
End: 2024-10-01

## 2024-10-01 DIAGNOSIS — Z95.2 HX OF MITRAL VALVE REPLACEMENT WITH MECHANICAL VALVE: Primary | ICD-10-CM

## 2024-10-01 LAB — INR BLD: 1.2

## 2024-10-02 ENCOUNTER — ANTI-COAG VISIT (OUTPATIENT)
Age: 64
End: 2024-10-02
Payer: MEDICARE

## 2024-10-02 ENCOUNTER — TELEPHONE (OUTPATIENT)
Age: 64
End: 2024-10-02

## 2024-10-02 DIAGNOSIS — Z95.2 HX OF MITRAL VALVE REPLACEMENT WITH MECHANICAL VALVE: Primary | ICD-10-CM

## 2024-10-02 LAB
INTERNATIONAL NORMALIZATION RATIO, POC: 1.7
PROTHROMBIN TIME, POC: 20.6

## 2024-10-02 PROCEDURE — 99212 OFFICE O/P EST SF 10 MIN: CPT

## 2024-10-02 PROCEDURE — 85610 PROTHROMBIN TIME: CPT

## 2024-10-02 NOTE — PROGRESS NOTES
Patient seen in clinic for warfarin management due to mechanical mitral valve with an INR goal of 2.5-3.5.  Estimated duration of therapy is indefinite.     Patient states compliant all of the time with regimen.  No bleeding or thromboembolic side effects noted.  No significant med or dietary changes.  No significant recent illness or disease state changes.      Patient notes that she has taken an ounce of alcohol the last couple of nights in effort to raise INR.  Patient does not normally consume alcohol.  Advised patient to try cranberrry juice instead.    Patient reported that her home INR reading was 1.2 on Sunday 9/29.  She was instructed to take a boosted dose of 6mg (instead of 4mg) on Monday.    PT/INR done in office per protocol.  INR is 1.7 which is subtherapeutic. This is expected due to recent restart post-procedure.    Warfarin regimen will be 8mg boost today (instead of 6mg), then resume 6mg every Mon, Wed, Fri; 4mg all other days.  Patient to complete the remainder of Lovenox injections (2 syringes remaining).    Will retest in 8 days.    Patient understands dosing directions and information discussed. Dosing schedule and follow up appointment given to patient.   Progress note routed to referring physicians office. Discussed with patient the Pharmacist Collaborative Practice Agreement.  Patient provided verbal and/or electronic (ex. Servoyant) consent to participate in the collaborative practice agreement between the pharmacist and referred patient.     For Pharmacy Admin Tracking Only    Intervention Detail: Dose Adjustment: 1, reason: Therapy Optimization  Total # of Interventions Recommended: 1  Total # of Interventions Accepted: 1  Time Spent (min): 15

## 2024-10-04 ENCOUNTER — TELEPHONE (OUTPATIENT)
Dept: FAMILY MEDICINE CLINIC | Age: 64
End: 2024-10-04

## 2024-10-04 NOTE — TELEPHONE ENCOUNTER
Patient called in stating she just left her cardiologist due to her having SOB with exertion and they informed her to call her PCP to get  a appt     WBC low and was also low when she had her catheterization     Patient scheduled with Dr Mann   They want these things to be checked  Hemoglobin  Iron  Asthma   Anxiety     Please advise

## 2024-10-07 ENCOUNTER — TELEPHONE (OUTPATIENT)
Age: 64
End: 2024-10-07

## 2024-10-07 ENCOUNTER — HOSPITAL ENCOUNTER (OUTPATIENT)
Age: 64
Setting detail: SPECIMEN
Discharge: HOME | End: 2024-10-07

## 2024-10-07 ENCOUNTER — OFFICE VISIT (OUTPATIENT)
Dept: FAMILY MEDICINE CLINIC | Age: 64
End: 2024-10-07
Payer: MEDICARE

## 2024-10-07 VITALS
WEIGHT: 136 LBS | HEIGHT: 66 IN | SYSTOLIC BLOOD PRESSURE: 110 MMHG | OXYGEN SATURATION: 100 % | BODY MASS INDEX: 21.86 KG/M2 | DIASTOLIC BLOOD PRESSURE: 58 MMHG | HEART RATE: 60 BPM

## 2024-10-07 DIAGNOSIS — E03.9 BORDERLINE HYPOTHYROIDISM: ICD-10-CM

## 2024-10-07 DIAGNOSIS — D64.9 ANEMIA, UNSPECIFIED TYPE: ICD-10-CM

## 2024-10-07 DIAGNOSIS — Z95.2 HX OF MITRAL VALVE REPLACEMENT WITH MECHANICAL VALVE: Primary | ICD-10-CM

## 2024-10-07 DIAGNOSIS — R06.09 EXERTIONAL DYSPNEA: Primary | ICD-10-CM

## 2024-10-07 LAB
BASOPHILS # BLD: 0.04 K/UL (ref 0–0.2)
BASOPHILS NFR BLD: 1 % (ref 0–2)
EOSINOPHIL # BLD: 0.12 K/UL (ref 0–0.44)
EOSINOPHILS RELATIVE PERCENT: 2 % (ref 1–4)
ERYTHROCYTE [DISTWIDTH] IN BLOOD BY AUTOMATED COUNT: 14.7 % (ref 11.8–14.4)
FOLATE SERPL-MCNC: 17.9 NG/ML (ref 4.8–24.2)
HCT VFR BLD AUTO: 36.5 % (ref 36.3–47.1)
HGB BLD-MCNC: 11.5 G/DL (ref 11.9–15.1)
IMM GRANULOCYTES # BLD AUTO: <0.03 K/UL (ref 0–0.3)
IMM GRANULOCYTES NFR BLD: 0 %
IRON SATN MFR SERPL: 12 % (ref 20–55)
IRON SERPL-MCNC: 42 UG/DL (ref 37–145)
LYMPHOCYTES NFR BLD: 1.04 K/UL (ref 1.1–3.7)
LYMPHOCYTES RELATIVE PERCENT: 19 % (ref 24–43)
MCH RBC QN AUTO: 28 PG (ref 25.2–33.5)
MCHC RBC AUTO-ENTMCNC: 31.5 G/DL (ref 28.4–34.8)
MCV RBC AUTO: 89 FL (ref 82.6–102.9)
MONOCYTES NFR BLD: 0.63 K/UL (ref 0.1–1.2)
MONOCYTES NFR BLD: 12 % (ref 3–12)
NEUTROPHILS NFR BLD: 66 % (ref 36–65)
NEUTS SEG NFR BLD: 3.51 K/UL (ref 1.5–8.1)
NRBC BLD-RTO: 0 PER 100 WBC
PLATELET # BLD AUTO: 248 K/UL (ref 138–453)
PMV BLD AUTO: 11.3 FL (ref 8.1–13.5)
RBC # BLD AUTO: 4.1 M/UL (ref 3.95–5.11)
RBC # BLD: ABNORMAL 10*6/UL
T4 FREE SERPL-MCNC: 1 NG/DL (ref 0.92–1.68)
TIBC SERPL-MCNC: 343 UG/DL (ref 250–450)
TSH SERPL DL<=0.05 MIU/L-ACNC: 2.16 UIU/ML (ref 0.27–4.2)
UNSATURATED IRON BINDING CAPACITY: 301 UG/DL (ref 112–347)
VIT B12 SERPL-MCNC: 319 PG/ML (ref 232–1245)
WBC OTHER # BLD: 5.4 K/UL (ref 3.5–11.3)

## 2024-10-07 PROCEDURE — 3078F DIAST BP <80 MM HG: CPT | Performed by: FAMILY MEDICINE

## 2024-10-07 PROCEDURE — 99214 OFFICE O/P EST MOD 30 MIN: CPT | Performed by: FAMILY MEDICINE

## 2024-10-07 PROCEDURE — 3074F SYST BP LT 130 MM HG: CPT | Performed by: FAMILY MEDICINE

## 2024-10-07 PROCEDURE — 1124F ACP DISCUSS-NO DSCNMKR DOCD: CPT | Performed by: FAMILY MEDICINE

## 2024-10-07 NOTE — PROGRESS NOTES
[facility]  [facility]  [address]  [address]  [place] [address]  Dept: [phone]      Keysha Ordoñez is a 64 y.o. female who presents today for follow up on her  medical conditions as noted below.      Chief Complaint   Patient presents with    Shortness of Breath     With movement within the last 6 months     Fatigue       Patient Active Problem List:     Chronic right-sided low back pain without sciatica     Osteopenia     Diverticulitis     Vitamin D deficiency     Depression with anxiety     H/O total hysterectomy     History of tricuspid valve repair     Hx of mitral valve replacement with mechanical valve     Overweight (BMI 25.0-29.9)     Family history of diabetes mellitus     History of atrial fibrillation     On warfarin at home     CHF (congestive heart failure) ([facility])     HTN (hypertension)     Junctional bradycardia     Non-rheumatic mitral regurgitation     Gingivitis     Hyperlipidemia     MVP (mitral valve prolapse)     Overactive bladder     PAF (paroxysmal atrial fibrillation) (HCC)     Age-related osteoporosis without current pathological fracture     Presence of prosthetic heart valve     Pneumonia     Adjustment disorder with depressed mood     Secondary hypercoagulable state (HCC)     Lightheadedness     Abnormal stress test     Past Medical History:   Diagnosis Date    Age-related osteoporosis without current pathological fracture 11/18/2019    Atrial fibrillation (HCC)     Bradycardia     Chronic back pain     Depression     Diverticulitis     Gallstones 2017    lap andrew and 6/2017    H/O total hysterectomy 08/23/2018    Hyperlipidemia     Mitral regurgitation     Mitral valve disorder     OAB (overactive bladder)     Osteopenia     Overactive bladder     Paroxysmal A-fib (HCC)     Pelvic floor weakness     Pneumonia     Postmenopausal atrophic vaginitis     Thyroid disease     Vitamin D deficiency       Past Surgical History:

## 2024-10-08 LAB — THYROPEROXIDASE AB SERPL IA-ACNC: 64 IU/ML (ref 0–25)

## 2024-10-10 ENCOUNTER — ANTI-COAG VISIT (OUTPATIENT)
Age: 64
End: 2024-10-10
Payer: MEDICARE

## 2024-10-10 DIAGNOSIS — Z95.2 HX OF MITRAL VALVE REPLACEMENT WITH MECHANICAL VALVE: Primary | ICD-10-CM

## 2024-10-10 LAB
INTERNATIONAL NORMALIZATION RATIO, POC: 1.7
PROTHROMBIN TIME, POC: 20

## 2024-10-10 PROCEDURE — 85610 PROTHROMBIN TIME: CPT | Performed by: PHARMACIST

## 2024-10-10 PROCEDURE — 99212 OFFICE O/P EST SF 10 MIN: CPT | Performed by: PHARMACIST

## 2024-10-10 NOTE — PROGRESS NOTES
Patient seen in clinic for warfarin management due to mechanical mitral valve with an INR goal of 2.5-3.5.  Estimated duration of therapy is indefinite.     Patient states compliant all of the time with regimen.  No bleeding or thromboembolic side effects noted.  No significant dietary changes.  Patient started taking iron supplement for the diagnosis of iron deficiency. No other significant recent illness or disease state changes.       PT/INR done in office per protocol.  INR is 1.7 which is subtherapeutic.     Warfarin regimen will be 8mg today (10-), and tomorrow (10-); then resume the current dose of 6mg Mon/Wed/Fri; 6mg all other days .  Will retest in 1 week.    Patient understands dosing directions and information discussed. Dosing schedule and follow up appointment given to patient.   Progress note routed to referring physicians office. Discussed with patient the Pharmacist Collaborative Practice Agreement.  Patient provided verbal and/or electronic (ex. [email or web address]) consent to participate in the collaborative practice agreement between the pharmacist and referred patient.     For Pharmacy Admin Tracking Only    Intervention Detail: Dose Adjustment: 1, reason: Therapy Optimization  Total # of Interventions Recommended: 1  Total # of Interventions Accepted: 1  Time Spent (min): 15

## 2024-10-16 ENCOUNTER — TELEPHONE (OUTPATIENT)
Age: 64
End: 2024-10-16

## 2024-10-16 NOTE — TELEPHONE ENCOUNTER
Home INR result received.  Called and LVM for patient that INR is just below goal (2.4); however, we will not be adjusting the dose today.  Patient has an appointment tomorrow; will make any dosing adjustments as necessary tomorrow.

## 2024-10-17 ENCOUNTER — ANTI-COAG VISIT (OUTPATIENT)
Age: 64
End: 2024-10-17
Payer: MEDICARE

## 2024-10-17 DIAGNOSIS — Z95.2 HX OF MITRAL VALVE REPLACEMENT WITH MECHANICAL VALVE: Primary | ICD-10-CM

## 2024-10-17 LAB
INTERNATIONAL NORMALIZATION RATIO, POC: 2.1
PROTHROMBIN TIME, POC: 24.7

## 2024-10-17 PROCEDURE — 99212 OFFICE O/P EST SF 10 MIN: CPT

## 2024-10-17 PROCEDURE — 85610 PROTHROMBIN TIME: CPT

## 2024-10-17 NOTE — PROGRESS NOTES
Patient seen in clinic for warfarin management due to mechanical mitral valve with an INR goal of 2.5-3.5.  Estimated duration of therapy is indefinite.     Patient states compliant all of the time with regimen.  No bleeding or thromboembolic side effects noted.  No significant med or dietary changes.  No significant recent illness or disease state changes.      PT/INR done in office per protocol.  INR is 2.1 which is subtherapeutic with 40 mg over the last 7 days.    Warfarin regimen will be 8 mg today, followed by an increased maintenance regimen of 4 mg Thurs/Sat and 6 mg all other days to target 42 mg per week.  Will retest in 1 week.    Patient understands dosing directions and information discussed. Dosing schedule and follow up appointment given to patient.   Progress note routed to referring physicians office. Discussed with patient the Pharmacist Collaborative Practice Agreement.  Patient provided verbal and/or electronic (ex. [employer]) consent to participate in the collaborative practice agreement between the pharmacist and referred patient.     For Pharmacy Admin Tracking Only    Intervention Detail: Dose Adjustment: 1, reason: Therapy Optimization  Total # of Interventions Recommended: 1  Total # of Interventions Accepted: 1  Time Spent (min): 15

## 2024-10-24 ENCOUNTER — ANTI-COAG VISIT (OUTPATIENT)
Age: 64
End: 2024-10-24

## 2024-10-24 ENCOUNTER — TELEPHONE (OUTPATIENT)
Age: 64
End: 2024-10-24

## 2024-10-24 ENCOUNTER — ANTI-COAG VISIT (OUTPATIENT)
Age: 64
End: 2024-10-24
Payer: MEDICARE

## 2024-10-24 DIAGNOSIS — I48.11 LONGSTANDING PERSISTENT ATRIAL FIBRILLATION (HCC): ICD-10-CM

## 2024-10-24 DIAGNOSIS — Z95.2 HX OF MITRAL VALVE REPLACEMENT WITH MECHANICAL VALVE: Primary | ICD-10-CM

## 2024-10-24 LAB
INTERNATIONAL NORMALIZATION RATIO, POC: 2.7
PROTHROMBIN TIME, POC: 32.6

## 2024-10-24 PROCEDURE — 85610 PROTHROMBIN TIME: CPT

## 2024-10-24 PROCEDURE — 99211 OFF/OP EST MAY X REQ PHY/QHP: CPT

## 2024-10-24 RX ORDER — WARFARIN SODIUM 4 MG/1
6 TABLET ORAL DAILY
Qty: 135 TABLET | Refills: 2 | Status: SHIPPED | OUTPATIENT
Start: 2024-10-24

## 2024-10-24 NOTE — PROGRESS NOTES
Patient seen in clinic for warfarin management due to mechanical mitral valve with an INR goal of 2.5-3.5.  Estimated duration of therapy is indefinite.     Patient states compliant all of the time with regimen.  No bleeding or thromboembolic side effects noted.  No significant med or dietary changes.  No significant recent illness or disease state changes.      Patient requested refill, sent to [facility] pharmacy.    PT/INR done in office per protocol.  INR is 2.7 which is therapeutic.     Warfarin regimen will be 6 mg every day.  Will retest in 3 weeks.    Patient understands dosing directions and information discussed. Dosing schedule and follow up appointment given to patient.   Progress note routed to referring physicians office. Discussed with patient the Pharmacist Collaborative Practice Agreement.  Patient provided verbal and/or electronic (ex. [employer]) consent to participate in the collaborative practice agreement between the pharmacist and referred patient.     For Pharmacy Admin Tracking Only    Intervention Detail:   Total # of Interventions Recommended: 0  Total # of Interventions Accepted: 0  Time Spent (min): 15

## 2024-10-24 NOTE — TELEPHONE ENCOUNTER
Patient was a no-call/no-show for an INR appointment today.  Called Keysha and escheduled the appointment for later today, 10/24/2024, at 3:30 instead of 1:00.

## 2024-11-04 ENCOUNTER — TELEPHONE (OUTPATIENT)
Dept: FAMILY MEDICINE CLINIC | Age: 64
End: 2024-11-04

## 2024-11-04 DIAGNOSIS — R06.09 EXERTIONAL DYSPNEA: Primary | ICD-10-CM

## 2024-11-04 NOTE — TELEPHONE ENCOUNTER
----- Message from Sherrill HERNDON sent at 10/29/2024 11:33 AM EDT -----  Regarding: [facility] Referral Request  [facility] Referral Request    Reason for referral request: Specialty Provider    Specialist/Lab/Test patient is requesting (if known):Cardiologist    Specialist Phone Number (if applicable):    Additional Information Need a new Cardiologist and want the referral to be sent to new specialist electrophysiologist cardiologist fax number [phone].  --------------------------------------------------------------------------------------------------------------------------    Relationship to Patient: Self     Call Back Information: OK to leave message on voicemail  Preferred Call Back Number: Phone  [phone]

## 2024-11-14 ENCOUNTER — TELEPHONE (OUTPATIENT)
Age: 64
End: 2024-11-14

## 2024-11-14 NOTE — TELEPHONE ENCOUNTER
Leeanna called to cancel INR appointment today as she is currently out of town. She states she tested her INR on home meter and result was 2.7. She will continue current regimen. Patient states she needs to check her schedule and will call us back to set new appointment.

## 2024-11-22 ENCOUNTER — TELEPHONE (OUTPATIENT)
Dept: FAMILY MEDICINE CLINIC | Age: 64
End: 2024-11-22

## 2024-11-22 DIAGNOSIS — R06.02 SOB (SHORTNESS OF BREATH): Primary | ICD-10-CM

## 2024-11-22 NOTE — TELEPHONE ENCOUNTER
Patient called is having shortness of breath and her cardiologist believes its her lungs, is requesting referral. Pended per patient preference. If an appointment is needed patient agreeable . Please advise.       Mingo Sweet MD   (f) [phone]

## 2024-11-25 ENCOUNTER — TELEPHONE (OUTPATIENT)
Age: 64
End: 2024-11-25

## 2024-11-25 NOTE — TELEPHONE ENCOUNTER
Returned voicemail from patient with request to schedule an appointment due to her home INR reading of 3.3. Scheduled appointment for 12/2.     Patient was concerned about dosing for the next week. As she is still in her therapeutic range of 2.5 - 3.5, we recommended continuing her dose of 6mg daily. We suggested to increase her leafy green intake if she is concerned.

## 2024-12-03 ENCOUNTER — ANTI-COAG VISIT (OUTPATIENT)
Age: 64
End: 2024-12-03
Payer: MEDICARE

## 2024-12-03 DIAGNOSIS — Z95.2 HX OF MITRAL VALVE REPLACEMENT WITH MECHANICAL VALVE: Primary | ICD-10-CM

## 2024-12-03 LAB
INTERNATIONAL NORMALIZATION RATIO, POC: 3.8
PROTHROMBIN TIME, POC: 45.7

## 2024-12-03 PROCEDURE — 99212 OFFICE O/P EST SF 10 MIN: CPT

## 2024-12-03 PROCEDURE — 85610 PROTHROMBIN TIME: CPT

## 2024-12-03 NOTE — PROGRESS NOTES
Patient seen in clinic for warfarin management due to mechanical mitral valve with an INR goal of 2.5-3.5.  Estimated duration of therapy is indefinite.     Patient states compliant all of the time with regimen.  No bleeding or thromboembolic side effects noted.    No significant recent illness or disease state changes. Patient is currently not taking a number of her regular supplements (magnesium, tumeric, flaxseed), but indicates that she plans to restart these soon. Patient was prescribed losartan in September for her heart, but has not been taking it because she is concerned about low blood pressure. Recommended that she reach out to provider and discuss concerns with them. Patient also endorsed temporary diet changes due to Thanksgiving, but does not plan to continue with them.     PT/INR done in office per protocol.  INR is 3.8 which is supratherapeutic, likely due to changes in diet over Thanksgiving.     Warfarin regimen will be reduced for 1 day to dose of 4mg and then resumed dose of at 6mg daily.  Will retest in 2 weeks.    Patient understands dosing directions and information discussed. Dosing schedule and follow up appointment given to patient.   Progress note routed to referring physicians office. Discussed with patient the Pharmacist Collaborative Practice Agreement.  Patient provided verbal and/or electronic (ex. [employer]) consent to participate in the collaborative practice agreement between the pharmacist and referred patient.     For Pharmacy Admin Tracking Only    Intervention Detail: Dose Adjustment: 1, reason: Therapy Optimization  Total # of Interventions Recommended: 1  Total # of Interventions Accepted: 1  Time Spent (min): 15

## 2024-12-13 ENCOUNTER — TELEPHONE (OUTPATIENT)
Age: 64
End: 2024-12-13

## 2024-12-13 DIAGNOSIS — Z95.2 HX OF MITRAL VALVE REPLACEMENT WITH MECHANICAL VALVE: Primary | ICD-10-CM

## 2024-12-13 LAB
INR BLD: 8
PROTIME: NORMAL

## 2024-12-13 NOTE — TELEPHONE ENCOUNTER
Patient called stating she tested her INR today with home meter and it read 8.0 on two separate tests. She denies changes in diet, medication, and was compliant with dosing instruction. Advised she hold 3 doses of warfarin and retest with home INR meter on Monday. We will schedule next appt in clinic based on these results. She is aware to proceed to ED if any signs or symptoms of bleeding.

## 2024-12-16 ENCOUNTER — ANTI-COAG VISIT (OUTPATIENT)
Age: 64
End: 2024-12-16

## 2024-12-16 DIAGNOSIS — Z95.2 HX OF MITRAL VALVE REPLACEMENT WITH MECHANICAL VALVE: Primary | ICD-10-CM

## 2024-12-16 LAB
INR BLD: 3.2
PROTIME: NORMAL

## 2024-12-16 NOTE — PROGRESS NOTES
Patient confirms holding her warfarin through the weekend as instructed.  No bleeding or thromboembolic side effects noted.  No significant med or dietary changes.  No significant recent illness or disease state changes.      PT/INR done in patient's with her home INR machine.  INR is 3.2 which is therapeutic.     Warfarin regimen will be continued at current dose of 6mg daily.  Will have next INR drawn in 3 days.    Home nurse given dosing directions and follow up appointment.  Progress note routed to referring physicians office.    For Pharmacy Admin Tracking Only    Intervention Detail:   Total # of Interventions Recommended: 0  Total # of Interventions Accepted: 0  Time Spent (min): 15

## 2024-12-17 ENCOUNTER — OFFICE VISIT (OUTPATIENT)
Dept: FAMILY MEDICINE CLINIC | Age: 64
End: 2024-12-17

## 2024-12-17 VITALS
DIASTOLIC BLOOD PRESSURE: 70 MMHG | HEIGHT: 65 IN | WEIGHT: 136 LBS | OXYGEN SATURATION: 95 % | SYSTOLIC BLOOD PRESSURE: 122 MMHG | HEART RATE: 60 BPM | BODY MASS INDEX: 22.66 KG/M2

## 2024-12-17 DIAGNOSIS — Z12.31 ENCOUNTER FOR SCREENING MAMMOGRAM FOR MALIGNANT NEOPLASM OF BREAST: ICD-10-CM

## 2024-12-17 DIAGNOSIS — R06.09 DYSPNEA ON EXERTION: ICD-10-CM

## 2024-12-17 DIAGNOSIS — E78.2 MIXED HYPERLIPIDEMIA: ICD-10-CM

## 2024-12-17 DIAGNOSIS — E53.8 VITAMIN B 12 DEFICIENCY: ICD-10-CM

## 2024-12-17 DIAGNOSIS — Z95.2 HX OF MITRAL VALVE REPLACEMENT WITH MECHANICAL VALVE: ICD-10-CM

## 2024-12-17 DIAGNOSIS — R79.0 LOW MAGNESIUM LEVEL: ICD-10-CM

## 2024-12-17 DIAGNOSIS — I48.0 PAROXYSMAL ATRIAL FIBRILLATION (HCC): ICD-10-CM

## 2024-12-17 DIAGNOSIS — Z00.00 MEDICARE ANNUAL WELLNESS VISIT, SUBSEQUENT: Primary | ICD-10-CM

## 2024-12-17 DIAGNOSIS — E55.9 VITAMIN D DEFICIENCY: ICD-10-CM

## 2024-12-17 DIAGNOSIS — Z98.890 HISTORY OF TRICUSPID VALVE REPAIR: ICD-10-CM

## 2024-12-17 RX ORDER — MULTIVITAMIN WITH IRON
500 TABLET ORAL DAILY
Qty: 90 TABLET | Refills: 3 | Status: SHIPPED | OUTPATIENT
Start: 2024-12-17 | End: 2025-12-17

## 2024-12-17 RX ORDER — EZETIMIBE 10 MG/1
10 TABLET ORAL DAILY
Qty: 90 TABLET | Refills: 3 | Status: SHIPPED | OUTPATIENT
Start: 2024-12-17

## 2024-12-17 RX ORDER — MULTIVITAMIN WITH FOLIC ACID 400 MCG
TABLET ORAL
Qty: 90 TABLET | Refills: 3 | Status: SHIPPED | OUTPATIENT
Start: 2024-12-17

## 2024-12-17 ASSESSMENT — PATIENT HEALTH QUESTIONNAIRE - PHQ9
4. FEELING TIRED OR HAVING LITTLE ENERGY: NOT AT ALL
SUM OF ALL RESPONSES TO PHQ QUESTIONS 1-9: 1
SUM OF ALL RESPONSES TO PHQ9 QUESTIONS 1 & 2: 1
6. FEELING BAD ABOUT YOURSELF - OR THAT YOU ARE A FAILURE OR HAVE LET YOURSELF OR YOUR FAMILY DOWN: NOT AT ALL
SUM OF ALL RESPONSES TO PHQ QUESTIONS 1-9: 1
10. IF YOU CHECKED OFF ANY PROBLEMS, HOW DIFFICULT HAVE THESE PROBLEMS MADE IT FOR YOU TO DO YOUR WORK, TAKE CARE OF THINGS AT HOME, OR GET ALONG WITH OTHER PEOPLE: NOT DIFFICULT AT ALL
SUM OF ALL RESPONSES TO PHQ QUESTIONS 1-9: 1
1. LITTLE INTEREST OR PLEASURE IN DOING THINGS: NOT AT ALL
8. MOVING OR SPEAKING SO SLOWLY THAT OTHER PEOPLE COULD HAVE NOTICED. OR THE OPPOSITE, BEING SO FIGETY OR RESTLESS THAT YOU HAVE BEEN MOVING AROUND A LOT MORE THAN USUAL: NOT AT ALL
5. POOR APPETITE OR OVEREATING: NOT AT ALL
2. FEELING DOWN, DEPRESSED OR HOPELESS: SEVERAL DAYS
9. THOUGHTS THAT YOU WOULD BE BETTER OFF DEAD, OR OF HURTING YOURSELF: NOT AT ALL
7. TROUBLE CONCENTRATING ON THINGS, SUCH AS READING THE NEWSPAPER OR WATCHING TELEVISION: NOT AT ALL
SUM OF ALL RESPONSES TO PHQ QUESTIONS 1-9: 1
3. TROUBLE FALLING OR STAYING ASLEEP: NOT AT ALL

## 2024-12-17 ASSESSMENT — LIFESTYLE VARIABLES
HOW OFTEN DO YOU HAVE A DRINK CONTAINING ALCOHOL: NEVER
HOW MANY STANDARD DRINKS CONTAINING ALCOHOL DO YOU HAVE ON A TYPICAL DAY: PATIENT DOES NOT DRINK

## 2024-12-17 NOTE — PROGRESS NOTES
Medicare Annual Wellness Visit    Keysha Ordoñez is here for Medicare AWV    Assessment & Plan   Medicare annual wellness visit, subsequent  Paroxysmal atrial fibrillation (HCC)  -     Chelo Barnett MD, [facility]  -     Comprehensive Metabolic Panel; Future  Hx of mitral valve replacement with mechanical valve  -     Chelo Barnett MD, Cardiology, [place]  History of tricuspid valve repair  -     Chelo Barnett MD, Cardiology[facility]  Dyspnea on exertion  Encounter for screening mammogram for malignant neoplasm of breast  -     CARMELA DIGITAL SCREEN W OR WO CAD BILATERAL; Future  Vitamin D deficiency  -     Multiple Vitamin (DAILY-THELMA MULTIVITAMIN) TABS; Take 1 tablet by mouth daily, Disp-90 tablet, R-3Normal  -     vitamin D (CHOLECALCIFEROL) 25 MCG (1000 UT) TABS tablet; Take 1 tablet by mouth daily, Disp-90 tablet, R-3Normal  Vitamin B 12 deficiency  -     Multiple Vitamin (DAILY-THELMA MULTIVITAMIN) TABS; Take 1 tablet by mouth daily, Disp-90 tablet, R-3Normal  -     vitamin B-12 (CYANOCOBALAMIN) 500 MCG tablet; Take 1 tablet by mouth daily, Disp-90 tablet, R-3Normal  Low magnesium level  -     Multiple Vitamin (DAILY-THELMA MULTIVITAMIN) TABS; Take 1 tablet by mouth daily, Disp-90 tablet, R-3Normal  Mixed hyperlipidemia  -     ezetimibe (ZETIA) 10 MG tablet; Take 1 tablet by mouth daily, Disp-90 tablet, R-3Normal  -     Comprehensive Metabolic Panel; Future  -     Lipid Panel; Future    Recommendations for Preventive Services Due: see orders and patient instructions/AVS.  Recommended screening schedule for the next 5-10 years is provided to the patient in written form: see Patient Instructions/AVS.     Return in about 6 months (around 6/17/2025) for labs, cholesterol.     Subjective       Patient's complete Health Risk Assessment and screening values have been reviewed and are found in Flowsheets. The following problems were reviewed today and where

## 2024-12-17 NOTE — PATIENT INSTRUCTIONS
to screen for glaucoma; cataracts, macular degeneration, and other eye disorders.  A preventive dental visit is recommended every 6 months.  Try to get at least 150 minutes of exercise per week or 10,000 steps per day on a pedometer .  Order or download the FREE \"Exercise & Physical Activity: Your Everyday Guide\" from [employer] [place] [employer]. Call 1-404.104.1329 or search [employer] [place] [employer] online.  You need 5957-9361 mg of calcium and 6796-5710 IU of vitamin D per day. It is possible to meet your calcium requirement with diet alone, but a vitamin D supplement is usually necessary to meet this goal.  When exposed to the sun, use a sunscreen that protects against both UVA and UVB radiation with an SPF of 30 or greater. Reapply every 2 to 3 hours or after sweating, drying off with a towel, or swimming.  Always wear a seat belt when traveling in a car. Always wear a helmet when riding a bicycle or motorcycle.

## 2024-12-19 ENCOUNTER — ANTI-COAG VISIT (OUTPATIENT)
Age: 64
End: 2024-12-19
Payer: MEDICARE

## 2024-12-19 DIAGNOSIS — Z95.2 HX OF MITRAL VALVE REPLACEMENT WITH MECHANICAL VALVE: Primary | ICD-10-CM

## 2024-12-19 LAB
INTERNATIONAL NORMALIZATION RATIO, POC: 3.3
PROTHROMBIN TIME, POC: 39.4

## 2024-12-19 PROCEDURE — 85610 PROTHROMBIN TIME: CPT | Performed by: PHARMACIST

## 2024-12-19 PROCEDURE — 99212 OFFICE O/P EST SF 10 MIN: CPT | Performed by: PHARMACIST

## 2024-12-19 NOTE — PROGRESS NOTES
Patient seen in clinic for warfarin management due to mechanical mitral valve with an INR goal of 2.5-3.5.  Estimated duration of therapy is indefinite.     Patient states compliant all of the time with regimen.  No bleeding or thromboembolic side effects noted.  No significant med or dietary changes.  No significant recent illness or disease state changes.      PT/INR done in office per protocol.  INR is 3.3 which is therapeutic.     Warfarin regimen will be continued cautiously given recent spike, with 6mg Mon/Wed/Fri and 4mg all other days.  Will retest in 1 week with home INR monitor.  Next INR check to be scheduled in clinic.    Patient understands dosing directions and information discussed. Dosing schedule and follow up appointment given to patient.   Progress note routed to referring physicians office. Discussed with patient the Pharmacist Collaborative Practice Agreement.  Patient provided verbal and/or electronic (ex. [email or web address]) consent to participate in the collaborative practice agreement between the pharmacist and referred patient.     For Pharmacy Admin Tracking Only    Intervention Detail: Dose Adjustment: 1, reason: Therapy De-escalation  Total # of Interventions Recommended: 1  Total # of Interventions Accepted: 1  Time Spent (min): 15

## 2024-12-23 ENCOUNTER — TELEPHONE (OUTPATIENT)
Dept: FAMILY MEDICINE CLINIC | Age: 64
End: 2024-12-23

## 2024-12-23 DIAGNOSIS — J01.90 ACUTE NON-RECURRENT SINUSITIS, UNSPECIFIED LOCATION: Primary | ICD-10-CM

## 2024-12-23 RX ORDER — CIPROFLOXACIN 500 MG/1
500 TABLET, FILM COATED ORAL 2 TIMES DAILY
Qty: 14 TABLET | Refills: 0 | Status: SHIPPED | OUTPATIENT
Start: 2024-12-23 | End: 2024-12-30

## 2024-12-23 NOTE — TELEPHONE ENCOUNTER
Patient called is having a sinus infection, fever sore throat, head congestion. Is requesting ciprofloxacin. Has taken a covid test negative, started yesterday, has not tired anything otc due to being on warfarin. Please advise.       [facility] [id number] - [place], [place] - [address] - P [phone] - F [phone]

## 2024-12-26 ENCOUNTER — ANTI-COAG VISIT (OUTPATIENT)
Age: 64
End: 2024-12-26
Payer: MEDICARE

## 2024-12-26 DIAGNOSIS — Z95.2 HX OF MITRAL VALVE REPLACEMENT WITH MECHANICAL VALVE: Primary | ICD-10-CM

## 2024-12-26 LAB
INR BLD: 3.9
PROTIME: NORMAL

## 2024-12-26 NOTE — PROGRESS NOTES
Spoke with patient via phone.  Patient  has been adjusting dose on her own to account for a 10 day course Ciprofloxacin 500mg due to an infection in her arm .  No bleeding or thromboembolic side effects noted.  No significant dietary changes.  No significant recent disease state changes.      PT/INR done with home machine.  INR is 3.9 which is supratherapeutic due to current antibiotic.     Warfarin regimen will be decreased to 4mg daily through Monday due to ongoing antibiotic treatment.  Will have next INR drawn on 12/31/24.    Patient given dosing directions and follow up appointment.  Progress note routed to referring physicians office.    For Pharmacy Admin Tracking Only    Intervention Detail: Dose Adjustment: 1, reason: Therapy De-escalation  Total # of Interventions Recommended: 1  Total # of Interventions Accepted: 1  Time Spent (min): 15

## 2024-12-31 ENCOUNTER — ANTI-COAG VISIT (OUTPATIENT)
Age: 64
End: 2024-12-31
Payer: MEDICARE

## 2024-12-31 DIAGNOSIS — Z95.2 HX OF MITRAL VALVE REPLACEMENT WITH MECHANICAL VALVE: Primary | ICD-10-CM

## 2024-12-31 LAB
INTERNATIONAL NORMALIZATION RATIO, POC: 3.1
PROTHROMBIN TIME, POC: 0

## 2024-12-31 PROCEDURE — 99212 OFFICE O/P EST SF 10 MIN: CPT

## 2024-12-31 PROCEDURE — 85610 PROTHROMBIN TIME: CPT

## 2024-12-31 NOTE — PROGRESS NOTES
Patient states compliant all of the time with regimen.  No bleeding or thromboembolic side effects noted.  No significant med or dietary changes.  No significant recent illness or disease state changes.  Patient states she has been taking 1/2 tab BID of cipro due to fear of elevated INR. Instructed her to continue with the prescribed dose as we adjusted the warfarin to account for INR fluctuation. She should only have a couple days left.    PT/INR done in patient's home by home care provider.  INR is 3.1 which is therapeutic.     Warfarin regimen will be return to maintenance regimen of 6 mg daily.  Will have next INR drawn 1/6/25.    Home nurse given dosing directions and follow up appointment.  Progress note routed to referring physicians office.    For Pharmacy Admin Tracking Only    Intervention Detail:   Total # of Interventions Recommended: 1  Total # of Interventions Accepted: 1  Time Spent (min): 15

## 2025-01-06 DIAGNOSIS — I48.11 LONGSTANDING PERSISTENT ATRIAL FIBRILLATION (HCC): ICD-10-CM

## 2025-01-06 RX ORDER — WARFARIN SODIUM 4 MG/1
4-6 TABLET ORAL DAILY
Qty: 135 TABLET | Refills: 2 | Status: SHIPPED | OUTPATIENT
Start: 2025-01-06

## 2025-01-15 ENCOUNTER — TELEPHONE (OUTPATIENT)
Age: 65
End: 2025-01-15

## 2025-01-15 ENCOUNTER — ANTI-COAG VISIT (OUTPATIENT)
Age: 65
End: 2025-01-15

## 2025-01-15 DIAGNOSIS — Z95.2 HX OF MITRAL VALVE REPLACEMENT WITH MECHANICAL VALVE: Primary | ICD-10-CM

## 2025-01-15 LAB
INTERNATIONAL NORMALIZATION RATIO, POC: 2.7
PROTHROMBIN TIME, POC: 0

## 2025-01-15 NOTE — PROGRESS NOTES
Spoke with patient via phone. She states she has been compliant all of the time with regimen.  No bleeding or thromboembolic side effects noted.  No significant med or dietary changes.  No significant recent illness or disease state changes.      PT/INR done in patient's home by home care provider.  INR is 2.7 which is therapeutic.     Warfarin regimen will be continued with 32mg weekly target. Patient to take 6mg Mon/Fri and 4mg all other days.  Will have next INR drawn in 2 weeks.    Home nurse given dosing directions and follow up appointment.  Progress note routed to referring physicians office.    For Pharmacy Admin Tracking Only    Intervention Detail: Dose Adjustment: 1, reason: Therapy De-escalation  Total # of Interventions Recommended: 1  Total # of Interventions Accepted: 1  Time Spent (min): 15

## 2025-01-15 NOTE — TELEPHONE ENCOUNTER
Patient has referral to see Dr. Fournier, she has multiple diagnosis, and would like a sooner appointment then next available. Please call patient at [phone] [facility]/sc

## 2025-01-16 ENCOUNTER — APPOINTMENT (OUTPATIENT)
Age: 65
End: 2025-01-16
Payer: MEDICARE

## 2025-01-16 NOTE — TELEPHONE ENCOUNTER
Patient scheduled on 2/25/25 with Dr. Fournier. Patient will let us know if she gets in somewhere sooner. She does not want to follow with Dr. Ibarra with [facility] any longer.

## 2025-01-28 ENCOUNTER — TELEPHONE (OUTPATIENT)
Age: 65
End: 2025-01-28

## 2025-01-28 NOTE — TELEPHONE ENCOUNTER
Pt left  yesterday stating her INR per home machine was 2.6 (INR goal 2.5-3.5).      Called her back today, she reports taking 6mg yesterday as usual. Advised she continue her current regimen (6mg Mon/Fri, 4mg all other days) until her upcoming INR appt in the clinic on Thurs 1/30.

## 2025-01-30 ENCOUNTER — ANTI-COAG VISIT (OUTPATIENT)
Age: 65
End: 2025-01-30
Payer: MEDICARE

## 2025-01-30 DIAGNOSIS — Z95.2 HX OF MITRAL VALVE REPLACEMENT WITH MECHANICAL VALVE: Primary | ICD-10-CM

## 2025-01-30 LAB
INTERNATIONAL NORMALIZATION RATIO, POC: 2.7
PROTHROMBIN TIME, POC: 31.9

## 2025-01-30 PROCEDURE — 99212 OFFICE O/P EST SF 10 MIN: CPT

## 2025-01-30 PROCEDURE — 85610 PROTHROMBIN TIME: CPT

## 2025-01-30 NOTE — PROGRESS NOTES
Patient seen in clinic for warfarin management due to mechanical mitral valve with an INR goal of 2.5-3.5.  Estimated duration of therapy is indefinite.     Patient states compliant all of the time with regimen.  No bleeding or thromboembolic side effects noted.  No significant med or dietary changes.  No significant recent illness or disease state changes.  She states she has been eating more salads with iceberg lettuce multiple times per week.    She feels nervous about her INR being on the lower side of the goal, she would like a dose increase to keep her INR higher especially while she continues to eat salads.    Patient has also moved to [place]. Advised there is a Coumadin clinic there and she would like to transfer there, provided the phone number.    PT/INR done in office per protocol.  INR is 2.7 which is therapeutic. Patient would like to keep her INR on the higher end. Advised that I would recommend to not adjust the dose at this time since her INR is therapeutic, but patient refused that.    Warfarin regimen will be increased to 6 mg Mon/Fri and 4 mg all other days.  Will retest in 2 weeks. Patient has home INR machine that she uses daily and will call to let us know if there are any INR elevations over these next 2 weeks.    Patient understands dosing directions and information discussed. Dosing schedule and follow up appointment given to patient.   Progress note routed to referring physicians office. Discussed with patient the Pharmacist Collaborative Practice Agreement.  Patient provided verbal and/or electronic (ex. [employer]) consent to participate in the collaborative practice agreement between the pharmacist and referred patient.     For Pharmacy Admin Tracking Only    Intervention Detail: Dose Adjustment: 1, reason: Patient Preference  Total # of Interventions Recommended: 1  Total # of Interventions Accepted: 1  Time Spent (min): 15

## 2025-02-06 ENCOUNTER — ANTI-COAG VISIT (OUTPATIENT)
Age: 65
End: 2025-02-06
Payer: MEDICARE

## 2025-02-06 DIAGNOSIS — Z95.2 HX OF MITRAL VALVE REPLACEMENT WITH MECHANICAL VALVE: Primary | ICD-10-CM

## 2025-02-06 LAB
INTERNATIONAL NORMALIZATION RATIO, POC: 3.2
PROTHROMBIN TIME, POC: 38.9

## 2025-02-06 PROCEDURE — 99211 OFF/OP EST MAY X REQ PHY/QHP: CPT | Performed by: PHARMACIST

## 2025-02-06 PROCEDURE — 85610 PROTHROMBIN TIME: CPT | Performed by: PHARMACIST

## 2025-02-06 NOTE — PROGRESS NOTES
Patient seen in clinic for warfarin management due to mechanical mitral valve with an INR goal of 2.5-3.5.  Estimated duration of therapy is indefinite.     Patient states compliant all of the time with regimen.  No bleeding or thromboembolic side effects noted.  No significant med or dietary changes.  No significant recent illness or disease state changes.      PT/INR done in office per protocol.  INR is 3.2 which is therapeutic.     Warfarin regimen will be continued at current dose of 6mg Mon/Wed/Fri and 4mg all other days.  Will retest in 2 weeks.    Patient understands dosing directions and information discussed. Dosing schedule and follow up appointment given to patient.   Progress note routed to referring physicians office. Discussed with patient the Pharmacist Collaborative Practice Agreement.  Patient provided verbal and/or electronic (ex. [employer]) consent to participate in the collaborative practice agreement between the pharmacist and referred patient.     For Pharmacy Admin Tracking Only    Intervention Detail:   Total # of Interventions Recommended: 0  Total # of Interventions Accepted: 0  Time Spent (min): 15

## 2025-02-12 ENCOUNTER — TELEPHONE (OUTPATIENT)
Dept: FAMILY MEDICINE CLINIC | Age: 65
End: 2025-02-12

## 2025-02-12 ENCOUNTER — OFFICE VISIT (OUTPATIENT)
Dept: FAMILY MEDICINE CLINIC | Age: 65
End: 2025-02-12

## 2025-02-12 VITALS
OXYGEN SATURATION: 100 % | HEART RATE: 56 BPM | BODY MASS INDEX: 21.66 KG/M2 | HEIGHT: 65 IN | SYSTOLIC BLOOD PRESSURE: 118 MMHG | WEIGHT: 130 LBS | DIASTOLIC BLOOD PRESSURE: 59 MMHG

## 2025-02-12 DIAGNOSIS — E55.9 VITAMIN D DEFICIENCY: ICD-10-CM

## 2025-02-12 DIAGNOSIS — E78.2 MIXED HYPERLIPIDEMIA: ICD-10-CM

## 2025-02-12 DIAGNOSIS — R79.0 LOW MAGNESIUM LEVEL: ICD-10-CM

## 2025-02-12 DIAGNOSIS — I50.30 DIASTOLIC CONGESTIVE HEART FAILURE, UNSPECIFIED HF CHRONICITY (HCC): ICD-10-CM

## 2025-02-12 DIAGNOSIS — E53.8 VITAMIN B 12 DEFICIENCY: ICD-10-CM

## 2025-02-12 DIAGNOSIS — H65.03 NON-RECURRENT ACUTE SEROUS OTITIS MEDIA OF BOTH EARS: Primary | ICD-10-CM

## 2025-02-12 DIAGNOSIS — R11.0 NAUSEA: Primary | ICD-10-CM

## 2025-02-12 DIAGNOSIS — I48.0 PAROXYSMAL ATRIAL FIBRILLATION (HCC): ICD-10-CM

## 2025-02-12 RX ORDER — GUAIFENESIN 600 MG/1
1200 TABLET, EXTENDED RELEASE ORAL 2 TIMES DAILY
Qty: 40 TABLET | Refills: 0
Start: 2025-02-12 | End: 2025-02-22

## 2025-02-12 RX ORDER — VITAMIN E (DL,TOCOPHERYL ACET) 180 MG
1 CAPSULE ORAL DAILY
Qty: 30 CAPSULE | Refills: 5 | Status: SHIPPED | OUTPATIENT
Start: 2025-02-12

## 2025-02-12 RX ORDER — MULTIVITAMIN WITH FOLIC ACID 400 MCG
TABLET ORAL
Qty: 90 TABLET | Refills: 3 | Status: SHIPPED | OUTPATIENT
Start: 2025-02-12

## 2025-02-12 RX ORDER — EZETIMIBE 10 MG/1
10 TABLET ORAL DAILY
Qty: 90 TABLET | Refills: 3 | Status: SHIPPED | OUTPATIENT
Start: 2025-02-12

## 2025-02-12 RX ORDER — MULTIVITAMIN WITH IRON
500 TABLET ORAL DAILY
Qty: 90 TABLET | Refills: 3 | Status: SHIPPED | OUTPATIENT
Start: 2025-02-12 | End: 2026-02-12

## 2025-02-12 RX ORDER — MOXIFLOXACIN HYDROCHLORIDE 400 MG/1
400 TABLET ORAL DAILY
Qty: 10 TABLET | Refills: 0 | Status: SHIPPED | OUTPATIENT
Start: 2025-02-12 | End: 2025-02-22

## 2025-02-12 SDOH — ECONOMIC STABILITY: FOOD INSECURITY: WITHIN THE PAST 12 MONTHS, THE FOOD YOU BOUGHT JUST DIDN'T LAST AND YOU DIDN'T HAVE MONEY TO GET MORE.: NEVER TRUE

## 2025-02-12 SDOH — ECONOMIC STABILITY: FOOD INSECURITY: WITHIN THE PAST 12 MONTHS, YOU WORRIED THAT YOUR FOOD WOULD RUN OUT BEFORE YOU GOT MONEY TO BUY MORE.: NEVER TRUE

## 2025-02-12 ASSESSMENT — PATIENT HEALTH QUESTIONNAIRE - PHQ9
SUM OF ALL RESPONSES TO PHQ QUESTIONS 1-9: 3
SUM OF ALL RESPONSES TO PHQ QUESTIONS 1-9: 3
8. MOVING OR SPEAKING SO SLOWLY THAT OTHER PEOPLE COULD HAVE NOTICED. OR THE OPPOSITE, BEING SO FIGETY OR RESTLESS THAT YOU HAVE BEEN MOVING AROUND A LOT MORE THAN USUAL: NOT AT ALL
SUM OF ALL RESPONSES TO PHQ QUESTIONS 1-9: 3
5. POOR APPETITE OR OVEREATING: NOT AT ALL
7. TROUBLE CONCENTRATING ON THINGS, SUCH AS READING THE NEWSPAPER OR WATCHING TELEVISION: NOT AT ALL
SUM OF ALL RESPONSES TO PHQ QUESTIONS 1-9: 3
SUM OF ALL RESPONSES TO PHQ9 QUESTIONS 1 & 2: 0
9. THOUGHTS THAT YOU WOULD BE BETTER OFF DEAD, OR OF HURTING YOURSELF: NOT AT ALL
1. LITTLE INTEREST OR PLEASURE IN DOING THINGS: NOT AT ALL
2. FEELING DOWN, DEPRESSED OR HOPELESS: NOT AT ALL
3. TROUBLE FALLING OR STAYING ASLEEP: MORE THAN HALF THE DAYS
10. IF YOU CHECKED OFF ANY PROBLEMS, HOW DIFFICULT HAVE THESE PROBLEMS MADE IT FOR YOU TO DO YOUR WORK, TAKE CARE OF THINGS AT HOME, OR GET ALONG WITH OTHER PEOPLE: NOT DIFFICULT AT ALL
4. FEELING TIRED OR HAVING LITTLE ENERGY: SEVERAL DAYS
6. FEELING BAD ABOUT YOURSELF - OR THAT YOU ARE A FAILURE OR HAVE LET YOURSELF OR YOUR FAMILY DOWN: NOT AT ALL

## 2025-02-12 NOTE — TELEPHONE ENCOUNTER
Spoke to Keysha and she stated it's not her ears!!  She wants Dr. Carlson's in-put on this subject.

## 2025-02-12 NOTE — TELEPHONE ENCOUNTER
She is dizzy because of her ears not because of the medication she needs to stay on it give it time and she will be fine take it with food

## 2025-02-12 NOTE — TELEPHONE ENCOUNTER
Patient called in because the medication she was placed on today is making her dizzy and nauseous. She reports to have ate before she took the medication. [facility]acy is [facility] in [place].

## 2025-02-12 NOTE — PROGRESS NOTES
[facility]  [facility]  [address]  [address]  [place] [address]  Dept: [phone]      Keysha Ordoñez is a 65 y.o. female who presents today for follow up on her  medical conditions as noted below.      Chief Complaint   Patient presents with    Ear Fullness     Both ears        Patient Active Problem List:     Chronic right-sided low back pain without sciatica     Osteopenia     Diverticulitis     Vitamin D deficiency     Depression with anxiety     H/O total hysterectomy     History of tricuspid valve repair     Hx of mitral valve replacement with mechanical valve     Overweight (BMI 25.0-29.9)     Family history of diabetes mellitus     History of atrial fibrillation     On warfarin at home     CHF (congestive heart failure) ([facility])     HTN (hypertension)     Junctional bradycardia     Non-rheumatic mitral regurgitation     Gingivitis     Hyperlipidemia     MVP (mitral valve prolapse)     Overactive bladder     PAF (paroxysmal atrial fibrillation) ([facility])     Age-related osteoporosis without current pathological fracture     Presence of prosthetic heart valve     Pneumonia     Adjustment disorder with depressed mood     Secondary hypercoagulable state (HCC)     Lightheadedness     Abnormal stress test     Past Medical History:   Diagnosis Date    Age-related osteoporosis without current pathological fracture 11/18/2019    Atrial fibrillation (HCC)     Bradycardia     Chronic back pain     Depression     Diverticulitis     Gallstones 2017    lap andrew and 6/2017    H/O total hysterectomy 08/23/2018    Hyperlipidemia     Mitral regurgitation     Mitral valve disorder     OAB (overactive bladder)     Osteopenia     Overactive bladder     Paroxysmal A-fib (HCC)     Pelvic floor weakness     Pneumonia     Postmenopausal atrophic vaginitis     Thyroid disease     Vitamin D deficiency       Past Surgical History:   Procedure Laterality Date    ATRIAL ABLATION SURGERY

## 2025-02-13 RX ORDER — ONDANSETRON 4 MG/1
4 TABLET, FILM COATED ORAL 3 TIMES DAILY PRN
Qty: 15 TABLET | Refills: 1 | Status: SHIPPED | OUTPATIENT
Start: 2025-02-13

## 2025-02-13 NOTE — TELEPHONE ENCOUNTER
Patient is requesting zofran for the nauseous. Please advise.         [facility] [id number] - [facility] [address] [address] [phone] - F [phone]

## 2025-02-17 ENCOUNTER — TELEPHONE (OUTPATIENT)
Dept: FAMILY MEDICINE CLINIC | Age: 65
End: 2025-02-17

## 2025-02-17 DIAGNOSIS — R06.02 SOB (SHORTNESS OF BREATH): Primary | ICD-10-CM

## 2025-02-17 NOTE — TELEPHONE ENCOUNTER
Patient called in requesting a new Pulmonology referral as she relays she is having issues with the original office you referred her to.   She will like to be referred to [facility]  [address] [place] [address] Rd[address]  [address], [address]  [place], [address]  Get DirectionsTel: [phone]  Fax: [phone]

## 2025-02-19 ENCOUNTER — TELEPHONE (OUTPATIENT)
Age: 65
End: 2025-02-19

## 2025-02-19 NOTE — TELEPHONE ENCOUNTER
Pt called to cancel tomorrows appointment due to double ear infections. She is on Moxifloxacin for this.     She is checking INR with home machine. It was 3.1 today which is in range.     Rescheduled her appointment for Monday.

## 2025-02-20 ENCOUNTER — OFFICE VISIT (OUTPATIENT)
Dept: FAMILY MEDICINE CLINIC | Age: 65
End: 2025-02-20

## 2025-02-20 VITALS
WEIGHT: 130 LBS | HEART RATE: 56 BPM | OXYGEN SATURATION: 100 % | DIASTOLIC BLOOD PRESSURE: 53 MMHG | SYSTOLIC BLOOD PRESSURE: 116 MMHG | BODY MASS INDEX: 21.66 KG/M2 | HEIGHT: 65 IN

## 2025-02-20 DIAGNOSIS — H65.03 NON-RECURRENT ACUTE SEROUS OTITIS MEDIA OF BOTH EARS: Primary | ICD-10-CM

## 2025-02-20 NOTE — PROGRESS NOTES
(mg/dL)   Date Value   05/16/2024 87              Subjective:      HPI  History of Present Illness  The patient presents for evaluation of fluid in the ears.    She has been adhering to the prescribed regimen of Mucinex 1200 mg, administered twice daily, with only one or two doses remaining. She expresses concern regarding the potential need for antibiotic therapy and a referral to an otolaryngologist.    MEDICATIONS  Mucinex        Review of Systems:     Constitutional: Negative for fever, appetite change and fatigue.        Family social and medical history reviewed and unchanged     HENT: Negative.  Negative for nosebleeds, trouble swallowing and neck pain.    Eyes: Negative for photophobia and visual disturbance.   Respiratory: Negative.  Negative for chest tightness and shortness of breath.    Cardiovascular: Negative.  Negative for chest pain and leg swelling.   Gastrointestinal: Negative.  Negative for abdominal pain and blood in stool.   Endocrine: Negative for cold intolerance and polyuria.   Genitourinary: Negative for dysuria and hematuria.   Musculoskeletal: Negative.    Skin: Negative for rash.   Allergic/Immunologic: Negative.    Neurological: Negative.  Negative for dizziness, weakness and numbness.   Hematological: Negative.  Negative for adenopathy. Does not bruise/bleed easily.   Psychiatric/Behavioral: Negative for sleep disturbance, dysphoric mood and  decreased concentration. The patient is not nervous/anxious.        Objective:     Physical Exam:     Nursing note and vitals reviewed.  BP (!) 116/53   Pulse 56   Ht 1.651 m (5' 5\")   Wt 59 kg (130 lb)   SpO2 100%   BMI 21.63 kg/m²   Constitutional: She is oriented to person, place, and time. She   appears well-developed and well-nourished.  HENT:   Head: Normocephalic and atraumatic.    Right Ear: External ear normal. Tympanic membrane is not erythematous. + middle ear effusion.    Left Ear: External ear normal. Tympanic membrane is not

## 2025-02-24 ENCOUNTER — ANTI-COAG VISIT (OUTPATIENT)
Age: 65
End: 2025-02-24
Payer: MEDICARE

## 2025-02-24 DIAGNOSIS — Z95.2 HX OF MITRAL VALVE REPLACEMENT WITH MECHANICAL VALVE: Primary | ICD-10-CM

## 2025-02-24 LAB
INR BLD: 3
PROTIME: 35.5

## 2025-02-24 PROCEDURE — 85610 PROTHROMBIN TIME: CPT

## 2025-02-24 PROCEDURE — 99211 OFF/OP EST MAY X REQ PHY/QHP: CPT

## 2025-02-24 NOTE — PROGRESS NOTES
Patient seen in clinic for warfarin management due to mechanical mitral valve with an INR goal of 2.5-3.5.  Estimated duration of therapy is indefinite.     Patient states compliant all of the time with regimen.  She states she has been having some blood in her nasal mucus lately and believes it's due to the dry air. We recommended the use of a humidifier to help with this. No other bleeding or thromboembolic side effects noted.  No significant med or dietary changes. She states she has had an ear infection for weeks and just finished a 10 day course of moxifloxacin on 2/22. She was taking guaifenesin but stopped. No other significant recent illness or disease state changes.      PT/INR done in office per protocol.  INR is 3.0 which is therapeutic.     Warfarin regimen will be continued at current dose 6mg on Mon/Wed/Fri and 4mg daily the rest of the week.  Will retest in 4 weeks.    Patient understands dosing directions and information discussed. Dosing schedule and follow up appointment given to patient.   Progress note routed to referring physicians office. Discussed with patient the Pharmacist Collaborative Practice Agreement.  Patient provided verbal and/or electronic (ex. [employer]) consent to participate in the collaborative practice agreement between the pharmacist and referred patient.     For Pharmacy Admin Tracking Only    Intervention Detail:   Total # of Interventions Recommended: 0  Total # of Interventions Accepted: 0  Time Spent (min): 15

## 2025-03-04 ENCOUNTER — TELEPHONE (OUTPATIENT)
Age: 65
End: 2025-03-04

## 2025-03-04 NOTE — TELEPHONE ENCOUNTER
Pt notified clinic that her home INR result today was 3.1 (goal 2.5-3.5) but she will be starting a 10-day course of steroids tomorrow for her ear. She does not know the dose yet but plans to call us back tomorrow after she gets the prescription. I advised she will likely need to reduce her warfarin to 4mg daily while she is on the steroids (usual regimen is 6mg MWF, 4mg all other days). She will also monitor her INR at home.

## 2025-03-06 ENCOUNTER — TELEPHONE (OUTPATIENT)
Dept: FAMILY MEDICINE CLINIC | Age: 65
End: 2025-03-06

## 2025-03-06 DIAGNOSIS — B00.9 HERPES: Primary | ICD-10-CM

## 2025-03-06 RX ORDER — VALACYCLOVIR HYDROCHLORIDE 1 G/1
2000 TABLET, FILM COATED ORAL 2 TIMES DAILY
Qty: 4 TABLET | Refills: 0 | Status: SHIPPED | OUTPATIENT
Start: 2025-03-06

## 2025-03-06 NOTE — TELEPHONE ENCOUNTER
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

## 2025-03-10 DIAGNOSIS — E53.8 VITAMIN B 12 DEFICIENCY: ICD-10-CM

## 2025-03-10 DIAGNOSIS — B00.9 HERPES: ICD-10-CM

## 2025-03-10 RX ORDER — VALACYCLOVIR HYDROCHLORIDE 1 G/1
2000 TABLET, FILM COATED ORAL 2 TIMES DAILY
Qty: 4 TABLET | Refills: 0 | Status: SHIPPED | OUTPATIENT
Start: 2025-03-10 | End: 2025-03-12 | Stop reason: ALTCHOICE

## 2025-03-10 NOTE — TELEPHONE ENCOUNTER
Keysha Ordoñez is calling to request a refill on the following medication(s):    Last Visit Date (If Applicable):  12/17/2024    Next Visit Date:    6/17/2025    Medication Request:  Requested Prescriptions     Pending Prescriptions Disp Refills    valACYclovir (VALTREX) 1 g tablet 4 tablet 0     Sig: Take 2 tablets by mouth 2 times daily

## 2025-03-11 DIAGNOSIS — I48.11 LONGSTANDING PERSISTENT ATRIAL FIBRILLATION (HCC): ICD-10-CM

## 2025-03-11 DIAGNOSIS — Z95.2 HX OF MITRAL VALVE REPLACEMENT WITH MECHANICAL VALVE: Primary | ICD-10-CM

## 2025-03-11 DIAGNOSIS — B00.9 HERPES: ICD-10-CM

## 2025-03-11 RX ORDER — VALACYCLOVIR HYDROCHLORIDE 1 G/1
TABLET, FILM COATED ORAL
Qty: 4 TABLET | Refills: 0 | OUTPATIENT
Start: 2025-03-11

## 2025-03-11 RX ORDER — WARFARIN SODIUM 4 MG/1
4-6 TABLET ORAL DAILY
Qty: 135 TABLET | Refills: 2 | Status: SHIPPED | OUTPATIENT
Start: 2025-03-11

## 2025-03-11 NOTE — TELEPHONE ENCOUNTER
Keysha Ordoñez is calling to request a refill on the following medication(s):    Last Visit Date (If Applicable):  12/17/2024    Next Visit Date:    6/17/2025    Medication Request:  Requested Prescriptions     Pending Prescriptions Disp Refills    valACYclovir (VALTREX) 1 g tablet [Pharmacy Med Name: valACYclovir HCL 1 GRAM TABLET] 4 tablet 0     Sig: TAKE 2 TABLETS BY MOUTH 2 TIMES A DAY

## 2025-03-11 NOTE — PROGRESS NOTES
Patient called stating INR on home meter was 2.4 on 3/9/25. She took 6mg on 3/10 due to being worried about the low INR (goal 2.5-3.5). She confirms she is on day 3 of 10 day Prednisone 20mg TID. She will continue 4mg daily until prednisone is complete then transition back to usual home dose of 6mg Mon/Wed/Fri and 4mg all other days. We will keep clinic INR set for 3/25/25. Patient is requesting ref[facility] to [facility] in [place] due to accidentally washing her warfarin in the laundry.

## 2025-03-12 ENCOUNTER — TELEPHONE (OUTPATIENT)
Dept: FAMILY MEDICINE CLINIC | Age: 65
End: 2025-03-12

## 2025-03-12 DIAGNOSIS — B00.9 HERPES: Primary | ICD-10-CM

## 2025-03-12 RX ORDER — VALACYCLOVIR HYDROCHLORIDE 1 G/1
1000 TABLET, FILM COATED ORAL 3 TIMES DAILY
Qty: 21 TABLET | Refills: 0 | Status: SHIPPED | OUTPATIENT
Start: 2025-03-12 | End: 2025-03-19

## 2025-03-12 NOTE — TELEPHONE ENCOUNTER
Patient is requesting medication refill for Valacyclovir. Stating she is still experiencing symptoms and would like more medication.  Please advise.

## 2025-03-25 ENCOUNTER — ANTI-COAG VISIT (OUTPATIENT)
Age: 65
End: 2025-03-25
Payer: MEDICARE

## 2025-03-25 DIAGNOSIS — Z95.2 HX OF MITRAL VALVE REPLACEMENT WITH MECHANICAL VALVE: Primary | ICD-10-CM

## 2025-03-25 LAB
INTERNATIONAL NORMALIZATION RATIO, POC: 3.4
PROTHROMBIN TIME, POC: 40.5

## 2025-03-25 PROCEDURE — 85610 PROTHROMBIN TIME: CPT

## 2025-03-25 PROCEDURE — 99211 OFF/OP EST MAY X REQ PHY/QHP: CPT

## 2025-03-25 NOTE — PROGRESS NOTES
Patient seen in clinic for warfarin management due to mechanical mitral valve with an INR goal of 2.5-3.5.  Estimated duration of therapy is indefinite.     Patient states compliant all of the time with regimen.  No bleeding or thromboembolic side effects noted.  No significant med or dietary changes. She states she has been having sinus issues and has pain and swelling due to this. She has also lost the hearing in her left ear due to fluid build-up.     PT/INR done in office per protocol.  INR is 3.4 which is therapeutic. Patient concerned about be in upper end of range. Counseled to increase green intake to help keep more midrange.     Warfarin regimen will be continued at current dose of 6mg Mon/Weds/Fri and 4mg all other days.  Will retest in 4 weeks.    Patient understands dosing directions and information discussed. Dosing schedule and follow up appointment given to patient.   Progress note routed to referring physicians office. Discussed with patient the Pharmacist Collaborative Practice Agreement.  Patient provided verbal and/or electronic (ex. [employer]) consent to participate in the collaborative practice agreement between the pharmacist and referred patient.     For Pharmacy Admin Tracking Only    Intervention Detail:   Total # of Interventions Recommended: 0  Total # of Interventions Accepted: 0  Time Spent (min): 15

## 2025-04-01 ENCOUNTER — TELEPHONE (OUTPATIENT)
Dept: FAMILY MEDICINE CLINIC | Age: 65
End: 2025-04-01

## 2025-04-01 DIAGNOSIS — E03.9 BORDERLINE HYPOTHYROIDISM: Primary | ICD-10-CM

## 2025-04-01 DIAGNOSIS — R59.1 LYMPHADENOPATHY OF HEAD AND NECK: ICD-10-CM

## 2025-04-01 NOTE — TELEPHONE ENCOUNTER
Keysha: [phone]    Patient wants to have a Referral to Endocrine for swollen glands on the  side of her Neck    Referral pended, add DX

## 2025-04-08 ENCOUNTER — TELEPHONE (OUTPATIENT)
Dept: FAMILY MEDICINE CLINIC | Age: 65
End: 2025-04-08

## 2025-04-08 NOTE — TELEPHONE ENCOUNTER
Patient states her ENT Specialist would like patient to have blood work completed for renal  function panel due to facial swelling, patient would like  call back when orders have been placed and also reminded to fast for 12 hrs due also completing  lipid panel order previously placed by pcp

## 2025-04-10 ENCOUNTER — TELEPHONE (OUTPATIENT)
Dept: FAMILY MEDICINE CLINIC | Age: 65
End: 2025-04-10

## 2025-04-10 NOTE — TELEPHONE ENCOUNTER
The patient called requesting an order for a CT of the temporal Bones with and without contrast per her ENT. Please call patient once complete.

## 2025-04-15 ENCOUNTER — HOSPITAL ENCOUNTER (OUTPATIENT)
Age: 65
Setting detail: SPECIMEN
Discharge: HOME | End: 2025-04-15

## 2025-04-15 ENCOUNTER — TELEPHONE (OUTPATIENT)
Age: 65
End: 2025-04-15

## 2025-04-15 DIAGNOSIS — E78.2 MIXED HYPERLIPIDEMIA: ICD-10-CM

## 2025-04-15 DIAGNOSIS — I48.0 PAROXYSMAL ATRIAL FIBRILLATION (HCC): ICD-10-CM

## 2025-04-15 LAB
ALBUMIN SERPL-MCNC: 4.3 G/DL (ref 3.5–5.2)
ALBUMIN/GLOB SERPL: 1.4 {RATIO} (ref 1–2.5)
ALP SERPL-CCNC: 69 U/L (ref 35–104)
ALT SERPL-CCNC: 30 U/L (ref 10–35)
ANION GAP SERPL CALCULATED.3IONS-SCNC: 8 MMOL/L (ref 9–16)
AST SERPL-CCNC: 34 U/L (ref 10–35)
BILIRUB SERPL-MCNC: 0.9 MG/DL (ref 0–1.2)
BUN SERPL-MCNC: 17 MG/DL (ref 8–23)
CALCIUM SERPL-MCNC: 9.9 MG/DL (ref 8.6–10.4)
CHLORIDE SERPL-SCNC: 108 MMOL/L (ref 98–107)
CHOLEST SERPL-MCNC: 203 MG/DL (ref 0–199)
CHOLESTEROL/HDL RATIO: 3
CO2 SERPL-SCNC: 28 MMOL/L (ref 20–31)
CREAT SERPL-MCNC: 1 MG/DL (ref 0.6–0.9)
GFR, ESTIMATED: 62 ML/MIN/1.73M2
GLUCOSE SERPL-MCNC: 92 MG/DL (ref 74–99)
HDLC SERPL-MCNC: 67 MG/DL
LDLC SERPL CALC-MCNC: 112 MG/DL (ref 0–100)
POTASSIUM SERPL-SCNC: 4.3 MMOL/L (ref 3.7–5.3)
PROT SERPL-MCNC: 7.3 G/DL (ref 6.6–8.7)
SODIUM SERPL-SCNC: 144 MMOL/L (ref 136–145)
TRIGL SERPL-MCNC: 122 MG/DL
VLDLC SERPL CALC-MCNC: 24 MG/DL (ref 1–30)

## 2025-04-15 NOTE — TELEPHONE ENCOUNTER
Patient left a voicemail stating she was going to be out of town the week of her appointment on 4/24 and requested it be moved to tomorrow. Scheduled patient for tomorrow at 1:45 pm.

## 2025-04-16 ENCOUNTER — ANTI-COAG VISIT (OUTPATIENT)
Age: 65
End: 2025-04-16
Payer: MEDICARE

## 2025-04-16 DIAGNOSIS — Z95.2 HX OF MITRAL VALVE REPLACEMENT WITH MECHANICAL VALVE: Primary | ICD-10-CM

## 2025-04-16 LAB
INTERNATIONAL NORMALIZATION RATIO, POC: 1.9
PROTHROMBIN TIME, POC: 22.9

## 2025-04-16 PROCEDURE — 85610 PROTHROMBIN TIME: CPT | Performed by: PHARMACIST

## 2025-04-16 PROCEDURE — 99212 OFFICE O/P EST SF 10 MIN: CPT | Performed by: PHARMACIST

## 2025-04-16 NOTE — PROGRESS NOTES
Patient seen in clinic for warfarin management due to mechanical mitral valve with an INR goal of 2.5-3.5.  Estimated duration of therapy is indefinite.     Patient states compliant all of the time with regimen.  No bleeding or thromboembolic side effects noted.  No significant med or dietary changes.  No significant recent illness or disease state changes.      PT/INR done in office per protocol.  INR is 1.9 which is subtherapeutic for no identifiable reason.     Warfarin regimen will be 8mg boost today, then resume usual regimen 6mg Mon/Wed/Fri, 4mg all other days.  Will retest in 2 weeks in clinic as she will be out of town next week, however she will recheck INR with her home meter in about 5 days and call us if she is still out of range.    Patient understands dosing directions and information discussed. Dosing schedule and follow up appointment given to patient.   Progress note routed to referring physicians office. Discussed with patient the Pharmacist Collaborative Practice Agreement.  Patient provided verbal and/or electronic (ex. [employer]) consent to participate in the collaborative practice agreement between the pharmacist and referred patient.     For Pharmacy Admin Tracking Only    Intervention Detail: Dose Adjustment: 1, reason: Therapy Optimization  Total # of Interventions Recommended: 1  Total # of Interventions Accepted: 1  Time Spent (min): 15

## 2025-04-17 ENCOUNTER — HOSPITAL ENCOUNTER (EMERGENCY)
Facility: CLINIC | Age: 65
Discharge: HOME | End: 2025-04-17
Attending: EMERGENCY MEDICINE
Payer: MEDICARE

## 2025-04-17 VITALS
HEART RATE: 58 BPM | OXYGEN SATURATION: 98 % | SYSTOLIC BLOOD PRESSURE: 178 MMHG | WEIGHT: 134 LBS | RESPIRATION RATE: 20 BRPM | TEMPERATURE: 97.8 F | DIASTOLIC BLOOD PRESSURE: 66 MMHG | HEIGHT: 65 IN | BODY MASS INDEX: 22.33 KG/M2

## 2025-04-17 DIAGNOSIS — H92.01 OTALGIA OF RIGHT EAR: Primary | ICD-10-CM

## 2025-04-17 PROCEDURE — 99283 EMERGENCY DEPT VISIT LOW MDM: CPT

## 2025-04-17 PROCEDURE — 6370000000 HC RX 637 (ALT 250 FOR IP): Performed by: EMERGENCY MEDICINE

## 2025-04-17 RX ADMIN — CARBAMIDE PEROXIDE 6.5% 5 DROP: 6.5 LIQUID AURICULAR (OTIC) at 12:48

## 2025-04-17 ASSESSMENT — PAIN - FUNCTIONAL ASSESSMENT: PAIN_FUNCTIONAL_ASSESSMENT: 0-10

## 2025-04-17 ASSESSMENT — PAIN DESCRIPTION - LOCATION: LOCATION: EAR

## 2025-04-17 ASSESSMENT — PAIN DESCRIPTION - ORIENTATION: ORIENTATION: RIGHT

## 2025-04-17 ASSESSMENT — PAIN SCALES - GENERAL: PAINLEVEL_OUTOF10: 5

## 2025-04-17 NOTE — DISCHARGE INSTRUCTIONS
Call today or tomorrow to follow up with Essie Carlson MD  in 1-4 days.    Ear drops (auralgan) - use 2 drops every 8 hours to the affected ear.  Use ibuprofen or Tylenol (unless prescribed medications that have Tylenol in it) for pain.  You can take over the counter Ibuprofen (advil) tablets (4 every 8 hours or 3 every 6 hours or 2 every 4 hours).    Take your medications as indicated.  If you are given an antibiotic then make sure you get the prescription filled and take the antibiotics until finished.  Drink plenty of water while taking the antibiotics.  Avoid drinking alcohol or drinks that have caffeine in it while taking antibiotics.     [facility], [facility], [facility] has some antibiotics for free; [facility] and [facility] has a 4 dollar prescription plan for some antibiotics.    Return to the Emergency Department for worsening of ear pain, fever > 101.5 and not controlled with Tylenol or ibuprofen, feeling of the room spinning, repeated bouts of dizziness, inability to hear, any other care or concern.

## 2025-04-17 NOTE — ED PROVIDER NOTES
[facility]  [address] [address]  Phone: [phone]      Patient Name:  Keysha Ordoñez  Medical Record Number:  [id number]  YOB: 1960  Date of Service:  4/17/2025  Primary Care Physician:  Essie Carlson MD      CHIEF COMPLAINT:       Chief Complaint   Patient presents with    Ear Pain     Pt c/o Rt. Ear pain past couple of weeks. Pt denies any drainage. Pt denies any injury.       HISTORY OF PRESENT ILLNESS:    Keysha Ordoñez is a 65 y.o. female who presents with the complaint of right-sided ear pain.  She describes it as feeling plugged up.  She has been sticking her finger in it and has pain when she sticks her finger in the ear.  She denies any chest pain or shortness of breath.  She does have a history of 2 open heart surgeries secondary to valve repairs but no history of coronary artery disease.  She denies any fever or chills.  He denies any sore throat.  She has hard of hearing in the left ear.    CURRENT MEDICATIONS:      Previous Medications    ALENDRONATE (FOSAMAX) 70 MG TABLET    TAKE 1 TABLET BY MOUTH ONCE WEEKLY ON AN EMPTY STOMACH BEFORE BREAKFAST. REMAIN UPRIGHT FOR 30 MINUTES AND TAKE WITH 8 OUNCES OF WATER    CALCIUM CARBONATE 600 MG TABS TABLET    Take 1 tablet by mouth in the morning and at bedtime    EZETIMIBE (ZETIA) 10 MG TABLET    Take 1 tablet by mouth daily    FERROUS SULFATE (IRON PO)    Take by mouth    LOSARTAN (COZAAR) 25 MG TABLET    Take 1 tablet by mouth daily    MAGNESIUM OXIDE -MG SUPPLEMENT 500 MG CAPS    Take 1 capsule by mouth daily    MULTIPLE VITAMIN (DAILY-THELMA MULTIVITAMIN) TABS    Take 1 tablet by mouth daily    NITROGLYCERIN (NITROSTAT) 0.4 MG SL TABLET    DISSOLVE 1 TAB UNDER TONGUE FOR CHEST PAIN - IF PAIN REMAINS AFTER 5 MIN, CALL 911 AND REPEAT DOSE. MAX 3 TABS IN 15 MINUTES    ONDANSETRON (ZOFRAN) 4 MG TABLET    Take 1 tablet by mouth 3 times daily as needed for Nausea or Vomiting    PT/INR TESTING MONITOR KIT

## 2025-04-21 ENCOUNTER — RESULTS FOLLOW-UP (OUTPATIENT)
Dept: FAMILY MEDICINE CLINIC | Age: 65
End: 2025-04-21

## 2025-04-28 ENCOUNTER — TELEPHONE (OUTPATIENT)
Age: 65
End: 2025-04-28

## 2025-04-28 NOTE — TELEPHONE ENCOUNTER
[facility] Medication Management Clinic Note  Patient requested to switch warfarin monitoring to [place] location from [facility]. Referral received from patient's current cardiologist Dr. Hemphill. Patient scheduled for initial appointment 5/1/25. Notified [facility] of switch.    Ellen Basilio PharmD  [facility]  4/28/2025 10:32 AM

## 2025-05-01 ENCOUNTER — ANTI-COAG VISIT (OUTPATIENT)
Age: 65
End: 2025-05-01
Payer: MEDICARE

## 2025-05-01 DIAGNOSIS — Z95.2 HX OF MITRAL VALVE REPLACEMENT WITH MECHANICAL VALVE: Primary | ICD-10-CM

## 2025-05-01 LAB
INTERNATIONAL NORMALIZATION RATIO, POC: 3
PROTHROMBIN TIME, POC: 36.5

## 2025-05-01 PROCEDURE — 85610 PROTHROMBIN TIME: CPT

## 2025-05-01 PROCEDURE — 99212 OFFICE O/P EST SF 10 MIN: CPT

## 2025-05-01 RX ORDER — DAPAGLIFLOZIN 10 MG/1
10 TABLET, FILM COATED ORAL EVERY MORNING
COMMUNITY
Start: 2025-04-14

## 2025-05-01 RX ORDER — CANDESARTAN 4 MG/1
2 TABLET ORAL DAILY
COMMUNITY
Start: 2025-04-14

## 2025-05-01 NOTE — PROGRESS NOTES
Medication Management Service, Warfarin Management  [facility] Medication Management, [phone]  Visit Date: 2025   Subjective:   Keysha Ordoñez is a 65 y.o. female who presents to clinic today for anticoagulation monitoring and adjustment. Today is patient's first visit with this clinic location, she previously followed with [facility] clinic for warfarin management.    Patient was referred for warfarin management due to  Indication:    ON-X valve replacement .   INR goal: of 2.5-3.5.  Duration of therapy: indefinite.    Patient reports the following:   Per home meter, INR was 2.0 on     Adherent with regimen:  Yes  Missed or extra doses:  None   Bleeding or thromboembolic side effects:  None    Significant medication, dietary, alcohol, or tobacco changes:  reviewed medication list in its entirety. She will be picking up candesartan and Farxiga today, has not yet started - added to medication list, would not anticipate these would impact INR    Significant recent illness, disease state changes, or hospitalization:  None  Upcoming surgeries or procedures:  None           Assessment and PLAN   PT/INR done in office per protocol.     INR today is 3, therapeutic.    Plan:  Instructed the patient to continue the current warfarin regimen of 6 mg Mon, Wed, Fri, and 4 mg on all other days.  Using warfarin 4 mg tablets.    Recheck INR in 4 week(s).     Patient verbalized understanding of dosing directions and information discussed. Dosing schedule given to patient. Progress note sent to referring office.  Patient acknowledges working in consult agreement with pharmacist as referred by his/her physician.      Electronically signed by Ellen Burnham RPH on 25 at 7:42 AM EDT    For Pharmacy Admin Tracking Only    Intervention Detail: Adherence Monitorin  Total # of Interventions Recommended: 2  Total # of Interventions Accepted: 2  Time Spent (min): 20

## 2025-05-05 DIAGNOSIS — E55.9 VITAMIN D DEFICIENCY: ICD-10-CM

## 2025-05-05 DIAGNOSIS — E78.2 MIXED HYPERLIPIDEMIA: ICD-10-CM

## 2025-05-05 DIAGNOSIS — R79.0 LOW MAGNESIUM LEVEL: ICD-10-CM

## 2025-05-05 DIAGNOSIS — E53.8 VITAMIN B 12 DEFICIENCY: ICD-10-CM

## 2025-05-05 RX ORDER — MULTIVITAMIN WITH IRON
500 TABLET ORAL DAILY
Qty: 90 TABLET | Refills: 0 | Status: SHIPPED | OUTPATIENT
Start: 2025-05-05 | End: 2025-08-03

## 2025-05-05 RX ORDER — MULTIVITAMIN WITH FOLIC ACID 400 MCG
TABLET ORAL
Qty: 90 TABLET | Refills: 0 | Status: SHIPPED | OUTPATIENT
Start: 2025-05-05

## 2025-05-05 RX ORDER — EZETIMIBE 10 MG/1
10 TABLET ORAL DAILY
Qty: 90 TABLET | Refills: 0 | Status: SHIPPED | OUTPATIENT
Start: 2025-05-05

## 2025-05-16 ENCOUNTER — TELEPHONE (OUTPATIENT)
Dept: FAMILY MEDICINE CLINIC | Age: 65
End: 2025-05-16

## 2025-05-16 DIAGNOSIS — R06.02 SOB (SHORTNESS OF BREATH): Primary | ICD-10-CM

## 2025-05-16 DIAGNOSIS — I50.30 DIASTOLIC CONGESTIVE HEART FAILURE, UNSPECIFIED HF CHRONICITY (HCC): ICD-10-CM

## 2025-05-16 NOTE — TELEPHONE ENCOUNTER
----- Message from Pedro PATTERSON sent at 5/15/2025  4:07 PM EDT -----  Regarding: E[facility] Referral Request  E[facility] Referral Request    Reason for referral request: Specialty Provider    Specialist/Lab/Test patient is requesting (if known): Temporary Home Health care    Specialist Phone Number (if applicable):    Additional Information : Pt would like to request a referral for her to get a home health care interim because patient's cardiology advise her to stat the medication.  --------------------------------------------------------------------------------------------------------------------------    Relationship to Patient: Self     Call Back Information: Do not leave any message, patient will call back for answer, Phone call or text  Preferred Call Back Number: Phone [phone]

## 2025-05-16 NOTE — TELEPHONE ENCOUNTER
----- Message from Sue LAUREN sent at 5/16/2025  1:59 PM EDT -----  Regarding: ECC Message to Provider  ECC Message to Provider    Relationship to Patient: Third Party [facility]    Additional Information The caller want someone to know that they are not able to accept the home health referral that we sent over  --------------------------------------------------------------------------------------------------------------------------    Call Back Information: OK to leave message on voicemail  Preferred Call Back Number: Phone  [phone]

## 2025-05-16 NOTE — TELEPHONE ENCOUNTER
Called patient she is starting candesartan cilexetil from cardiologist and the medication lowers blood pressure, causes dizziness and is requesting temporary home care, due to if her blood pressure bottoms out and she passes out she's not alone. Please advise.

## 2025-05-21 ENCOUNTER — TELEPHONE (OUTPATIENT)
Age: 65
End: 2025-05-21

## 2025-05-21 LAB
INR BLD: 4.3
PROTIME: NORMAL

## 2025-05-21 NOTE — TELEPHONE ENCOUNTER
Spoke to Keysha about INR being 4.3 today via home machine. Instructed patient to hold dose today and follow-up with Ellen tomorrow (5/22/25)

## 2025-05-22 NOTE — TELEPHONE ENCOUNTER
[facility] Medication Management Clinic Note  Called patient today to follow up. She held last night's dose as directed by Marilyn. No bleeding. Reports eating fewer salads recently. She will resume her baseline green vegetable intake. INR recheck already scheduled for next week 5/29. Will evaluate then. INR goal 2.5-3.5.    Ellen Basilio, PharmD  [facility]  5/22/2025 10:46 AM

## 2025-05-29 ENCOUNTER — ANTI-COAG VISIT (OUTPATIENT)
Age: 65
End: 2025-05-29
Payer: MEDICARE

## 2025-05-29 DIAGNOSIS — Z95.2 HX OF MITRAL VALVE REPLACEMENT WITH MECHANICAL VALVE: Primary | ICD-10-CM

## 2025-05-29 LAB
INTERNATIONAL NORMALIZATION RATIO, POC: 2.5
PROTHROMBIN TIME, POC: 29.9

## 2025-05-29 PROCEDURE — 85610 PROTHROMBIN TIME: CPT

## 2025-05-29 PROCEDURE — 99211 OFF/OP EST MAY X REQ PHY/QHP: CPT

## 2025-05-29 NOTE — PROGRESS NOTES
Medication Management Service, Warfarin Management  [facility] Medication Management, [phone]  Visit Date: 2025   Subjective:   Keysha Ordoñez is a 65 y.o. female who presents to clinic today for anticoagulation monitoring and adjustment.    Patient was referred for warfarin management due to  Indication:    ON-X valve replacement .   INR goal: of 2.5-3.5.  Duration of therapy: indefinite.    Patient last seen in office , INR 3.  Patient called  and stated INR was 4.3 with her home machine, she stated she had fewer vegetables prior to that reading, she held one dose and resumed maintenance dose per our recommendations    Patient reports the following:   Adherent with regimen:  Yes  Missed or extra doses:   just as directed for elevated INR    Bleeding or thromboembolic side effects:  None  Significant medication, dietary, alcohol, or tobacco changes:  None  Significant recent illness, disease state changes, or hospitalization:  None  Upcoming surgeries or procedures:  None           Assessment and PLAN   PT/INR done in office per protocol.     INR today is 2.5, therapeutic.    Plan:  Instructed the patient to continue the current warfarin regimen of 6 mg Mon, Wed, Fri, and 4 mg on all other days.  Using warfarin 4 mg tablets.    Recheck INR in 4 week(s) in office. Patient to call if she checks with home meter between now and then if her INR is out of range.    Patient verbalized understanding of dosing directions and information discussed. Dosing schedule given to patient. Progress note sent to referring office.  Patient acknowledges working in consult agreement with pharmacist as referred by his/her physician.      Electronically signed by Ellen Burnham RPH on 25 at 8:07 AM EDT    For Pharmacy Admin Tracking Only    Intervention Detail: Adherence Monitorin  Total # of Interventions Recommended: 2  Total # of Interventions Accepted: 2  Time Spent (min): 20

## 2025-06-17 ENCOUNTER — OFFICE VISIT (OUTPATIENT)
Dept: FAMILY MEDICINE CLINIC | Age: 65
End: 2025-06-17

## 2025-06-17 VITALS
DIASTOLIC BLOOD PRESSURE: 68 MMHG | HEART RATE: 54 BPM | BODY MASS INDEX: 21.8 KG/M2 | SYSTOLIC BLOOD PRESSURE: 110 MMHG | WEIGHT: 131 LBS

## 2025-06-17 DIAGNOSIS — E78.2 MIXED HYPERLIPIDEMIA: Primary | ICD-10-CM

## 2025-06-17 DIAGNOSIS — Z12.31 ENCOUNTER FOR SCREENING MAMMOGRAM FOR MALIGNANT NEOPLASM OF BREAST: ICD-10-CM

## 2025-06-17 DIAGNOSIS — Z78.0 MENOPAUSE: ICD-10-CM

## 2025-06-17 DIAGNOSIS — I50.30 DIASTOLIC CONGESTIVE HEART FAILURE, UNSPECIFIED HF CHRONICITY (HCC): ICD-10-CM

## 2025-06-17 DIAGNOSIS — M81.0 AGE-RELATED OSTEOPOROSIS WITHOUT CURRENT PATHOLOGICAL FRACTURE: ICD-10-CM

## 2025-06-17 RX ORDER — ALENDRONATE SODIUM 70 MG/1
70 TABLET ORAL
Qty: 4 TABLET | Refills: 5 | Status: SHIPPED | OUTPATIENT
Start: 2025-06-17

## 2025-06-17 NOTE — PROGRESS NOTES
[facility]  [facility]  [address]  [address]  [place] [address]  Dept: [phone]    6/17/2025    CHIEF COMPLAINT    Chief Complaint   Patient presents with    Hyperlipidemia       HPI    Keysha Ordoñez is a 65 y.o. female who presents   Chief Complaint   Patient presents with    Hyperlipidemia   .  HPI  History of Present Illness  The patient presents for a medical wellness checkup.    Heart Condition  Pulse recorded at 54 this morning, ejection fraction 48. Advised to take Farxiga for heart condition but has not started due to concerns about fainting while living alone. History of atrial fibrillation, making efforts to prevent recurrence. Seeking advice on maintaining heart health beyond medication. Active lifestyle includes walking, elliptical machine, and leg/thigh exercises.  - Onset: Pulse recorded at 54 this morning.  - Character: Ejection fraction 48, history of atrial fibrillation.  - Alleviating/Aggravating Factors: Concerns about fainting while living alone, advised to take Farxiga but has not started.  - Severity: Concerns about heart health and fainting.    Overactive Bladder  Overactive bladder causing dehydration, managing fluid intake. Awaiting instructions from Dr. Patel regarding overactive bladder, scheduled for bladder surgery with Dr. Patel. Anticipates increased fluid intake post-surgery.  - Character: Overactive bladder causing dehydration.  - Alleviating/Aggravating Factors: Managing fluid intake.  - Timing: Awaiting instructions from Dr. Patel, scheduled for bladder surgery.  - Severity: Anticipates increased fluid intake post-surgery.    Blood Pressure Management  Currently on losartan for blood pressure management. Blood pressure was high in February and April, better today.  - Onset: Blood pressure was high in February and April.  - Character: High blood pressure.  - Alleviating/Aggravating Factors: Currently on losartan.  -

## 2025-06-18 RX ORDER — SODIUM BICARBONATE
POWDER (GRAM) MISCELLANEOUS
COMMUNITY

## 2025-06-23 ENCOUNTER — TELEPHONE (OUTPATIENT)
Dept: FAMILY MEDICINE CLINIC | Age: 65
End: 2025-06-23

## 2025-06-23 DIAGNOSIS — N18.2 STAGE 2 CHRONIC KIDNEY DISEASE: Primary | ICD-10-CM

## 2025-06-23 NOTE — TELEPHONE ENCOUNTER
Patient is requesting a referral for Ernie Nunez MD. Please advise.     Pended needs Dx. Please advise.            (f) [phone]

## 2025-06-25 ENCOUNTER — TELEPHONE (OUTPATIENT)
Age: 65
End: 2025-06-25

## 2025-06-26 ENCOUNTER — TELEPHONE (OUTPATIENT)
Dept: FAMILY MEDICINE CLINIC | Age: 65
End: 2025-06-26

## 2025-06-26 NOTE — TELEPHONE ENCOUNTER
Mailed patient letter as a reminder to schedule mammogram.     Provided number to call [phone]

## 2025-06-27 ENCOUNTER — TELEPHONE (OUTPATIENT)
Age: 65
End: 2025-06-27

## 2025-06-27 NOTE — TELEPHONE ENCOUNTER
[facility] Medication Management Clinic Note  Patient was scheduled for INR check today. Reports vehicle problems, cancelling appointment and she WCB to reschedule    Ellen Basilio, [place]  [facility]  6/27/2025 8:58 AM

## 2025-07-03 ENCOUNTER — CLINICAL SUPPORT (OUTPATIENT)
Dept: FAMILY MEDICINE CLINIC | Age: 65
End: 2025-07-03
Payer: MEDICARE

## 2025-07-03 ENCOUNTER — HOSPITAL ENCOUNTER (OUTPATIENT)
Age: 65
Setting detail: SPECIMEN
Discharge: HOME | End: 2025-07-03

## 2025-07-03 ENCOUNTER — RESULTS FOLLOW-UP (OUTPATIENT)
Dept: FAMILY MEDICINE CLINIC | Age: 65
End: 2025-07-03

## 2025-07-03 VITALS — WEIGHT: 128.5 LBS | BODY MASS INDEX: 21.38 KG/M2 | HEART RATE: 60 BPM | OXYGEN SATURATION: 98 %

## 2025-07-03 DIAGNOSIS — R30.0 DYSURIA: Primary | ICD-10-CM

## 2025-07-03 DIAGNOSIS — I48.11 LONGSTANDING PERSISTENT ATRIAL FIBRILLATION (HCC): ICD-10-CM

## 2025-07-03 DIAGNOSIS — R30.0 DYSURIA: ICD-10-CM

## 2025-07-03 LAB
BILIRUBIN, POC: NEGATIVE
BLOOD URINE, POC: ABNORMAL
CLARITY, POC: CLEAR
COLOR, POC: YELLOW
GLUCOSE URINE, POC: NEGATIVE MG/DL
KETONES, POC: NEGATIVE MG/DL
LEUKOCYTE EST, POC: ABNORMAL
NITRITE, POC: POSITIVE
PH, POC: 7
PROTEIN, POC: ABNORMAL MG/DL
SPECIFIC GRAVITY, POC: 1.02
UROBILINOGEN, POC: 0.2 MG/DL

## 2025-07-03 PROCEDURE — 81003 URINALYSIS AUTO W/O SCOPE: CPT | Performed by: FAMILY MEDICINE

## 2025-07-03 PROCEDURE — 99211 OFF/OP EST MAY X REQ PHY/QHP: CPT | Performed by: FAMILY MEDICINE

## 2025-07-03 RX ORDER — CIPROFLOXACIN 250 MG/1
250 TABLET, FILM COATED ORAL 2 TIMES DAILY
Qty: 14 TABLET | Refills: 0 | Status: SHIPPED | OUTPATIENT
Start: 2025-07-03 | End: 2025-07-10

## 2025-07-04 LAB
MICROORGANISM SPEC CULT: ABNORMAL
SERVICE CMNT-IMP: ABNORMAL
SPECIMEN DESCRIPTION: ABNORMAL

## 2025-07-09 ENCOUNTER — OFFICE VISIT (OUTPATIENT)
Age: 65
End: 2025-07-09

## 2025-07-09 VITALS
TEMPERATURE: 98.3 F | OXYGEN SATURATION: 98 % | SYSTOLIC BLOOD PRESSURE: 129 MMHG | DIASTOLIC BLOOD PRESSURE: 64 MMHG | BODY MASS INDEX: 21.13 KG/M2 | HEART RATE: 56 BPM | WEIGHT: 127 LBS

## 2025-07-09 DIAGNOSIS — R33.9 RETENTION OF URINE: ICD-10-CM

## 2025-07-09 DIAGNOSIS — Z01.419 ENCOUNTER FOR ANNUAL ROUTINE GYNECOLOGICAL EXAMINATION: ICD-10-CM

## 2025-07-09 DIAGNOSIS — Z11.3 SCREENING EXAMINATION FOR STI: ICD-10-CM

## 2025-07-09 DIAGNOSIS — N32.81 OVERACTIVE BLADDER: Primary | ICD-10-CM

## 2025-07-09 LAB
BILIRUBIN, POC: NORMAL
BLOOD URINE, POC: NORMAL
CLARITY, POC: CLEAR
COLOR, POC: YELLOW
GLUCOSE URINE, POC: NORMAL MG/DL
KETONES, POC: NORMAL MG/DL
LEUKOCYTE EST, POC: NORMAL
NITRITE, POC: NORMAL
PH, POC: 7
PROTEIN, POC: NORMAL MG/DL
SPECIFIC GRAVITY, POC: 1.01
UROBILINOGEN, POC: NORMAL MG/DL

## 2025-07-09 RX ORDER — WARFARIN SODIUM 4 MG/1
TABLET ORAL
Qty: 135 TABLET | Refills: 2 | OUTPATIENT
Start: 2025-07-09

## 2025-07-09 RX ORDER — MIRABEGRON 50 MG/1
50 TABLET, FILM COATED, EXTENDED RELEASE ORAL DAILY
Qty: 30 TABLET | Refills: 5 | Status: SHIPPED | OUTPATIENT
Start: 2025-07-09 | End: 2025-07-09 | Stop reason: ALTCHOICE

## 2025-07-09 RX ORDER — MAGNESIUM 30 MG
30 TABLET ORAL 2 TIMES DAILY
COMMUNITY

## 2025-07-09 RX ORDER — TROSPIUM CHLORIDE 20 MG/1
20 TABLET, FILM COATED ORAL DAILY
Qty: 30 TABLET | Refills: 5 | Status: SHIPPED | OUTPATIENT
Start: 2025-07-09

## 2025-07-09 NOTE — PROGRESS NOTES
complete the questionnaires will not be given. The patient understands that failure to return the questionnaires may not give the provider a full picture of the patient's urogynecologic issues and make treatment less than optimal despite the practitioner's best effort to obtain information.     2. Educational handouts were discussed & given when applicable .     4.Testing recommendations were given as indicated.    The patient (or guardian, if applicable) and other individuals in attendance with the patient were advised that Artificial Intelligence will be utilized during this visit to record, process the conversation to generate a clinical note, and support improvement of the AI technology. The patient (or guardian, if applicable) and other individuals in attendance at the appointment consented to the use of AI, including the recording.         Multiple records reviewed. All questions were addressed to the patient's satisfaction.      Follow up: Return in about 6 months (around 1/9/2026) for follow up/recheck, med check.     Electronically signed by JACKIE Ch CNP on 7/9/2025 at 2:12 PM

## 2025-07-10 ENCOUNTER — ANTI-COAG VISIT (OUTPATIENT)
Age: 65
End: 2025-07-10
Payer: MEDICARE

## 2025-07-10 ENCOUNTER — TELEPHONE (OUTPATIENT)
Age: 65
End: 2025-07-10

## 2025-07-10 ENCOUNTER — HOSPITAL ENCOUNTER (OUTPATIENT)
Age: 65
Setting detail: SPECIMEN
Discharge: HOME | End: 2025-07-10

## 2025-07-10 DIAGNOSIS — Z95.2 HX OF MITRAL VALVE REPLACEMENT WITH MECHANICAL VALVE: Primary | ICD-10-CM

## 2025-07-10 DIAGNOSIS — I48.11 LONGSTANDING PERSISTENT ATRIAL FIBRILLATION (HCC): ICD-10-CM

## 2025-07-10 LAB
C TRACH DNA SPEC QL PROBE+SIG AMP: NEGATIVE
INTERNATIONAL NORMALIZATION RATIO, POC: 3.5
N GONORRHOEA DNA SPEC QL PROBE+SIG AMP: NEGATIVE
PROTHROMBIN TIME, POC: 41.9
SPECIMEN DESCRIPTION: NORMAL

## 2025-07-10 PROCEDURE — 85610 PROTHROMBIN TIME: CPT

## 2025-07-10 PROCEDURE — 99213 OFFICE O/P EST LOW 20 MIN: CPT

## 2025-07-10 RX ORDER — WARFARIN SODIUM 4 MG/1
4-6 TABLET ORAL DAILY
Qty: 135 TABLET | Refills: 2 | Status: SHIPPED | OUTPATIENT
Start: 2025-07-10

## 2025-07-10 NOTE — TELEPHONE ENCOUNTER
[facility] Medication Management Clinic Note  Called in refill of warfarin to [facility] in [place] due to electronic prescription transmission error. Left on pharmacy voicemail.    Ellen Basilio, PharmD  [facility]  7/10/2025 11:09 AM

## 2025-07-10 NOTE — PROGRESS NOTES
Medication Management Service, Warfarin Management  [facility] Medication Management, [phone]  Visit Date: 7/10/2025   Subjective:   Keysha Ordoñez is a 65 y.o. female who presents to clinic today for anticoagulation monitoring and adjustment.    Patient was referred for warfarin management due to  Indication:    ON-X valve replacement .   INR goal: of 2.5-3.5.  Duration of therapy: indefinite.    Patient reports the following:   Adherent with regimen:  mostly  Missed or extra doses:  patient states she checked her INR on her home machine at some point since last visit and INR was 4, so she skipped one dose of warfarin. Not sure what date. She had also talked to a pharmacist in the clinic regarding a missed dose 6/24 and made up for that on 6/25.     Bleeding or thromboembolic side effects:  None    Significant medication, dietary, alcohol, or tobacco changes:  thinks she has been eating less than usual, has a lot on her mind because she states her daughter is suicidal. Patient completed a course of Levaquin for UTI. She did not call clinic to discuss this. She believes UTI was caused by starting Farxiga, which she has now stopped. She is calling in her blood pressure readings to cardiology today in anticipation of possibly starting a new medication. Advised to let clinic know anytime a medication is started or stopped.    Significant recent illness, disease state changes, or hospitalization:  None  Upcoming surgeries or procedures:  None           Assessment and PLAN   PT/INR done in office per protocol.     INR today is 3.5, therapeutic.    Plan:  Instructed the patient to continue the current warfarin regimen of 6 mg Mon, Wed, Fri, and 4 mg on all other days.  Using warfarin 4 mg tablets. Suspect elevated INR patient got on her home machine was likely due to antibiotic use which is completed. Will continue home dose at this time.    Patient requested war[facility]in ref[facility] to [facility] [place]

## 2025-07-14 ENCOUNTER — TELEPHONE (OUTPATIENT)
Dept: FAMILY MEDICINE CLINIC | Age: 65
End: 2025-07-14

## 2025-07-14 ENCOUNTER — TELEPHONE (OUTPATIENT)
Age: 65
End: 2025-07-14

## 2025-07-14 NOTE — TELEPHONE ENCOUNTER
[facility] Medication Management Clinic Note  Patient called clinic asking if there was any way she could get some warfarin from us because she accidentally put her warfarin through the washing machine. Advised we do not dispense warfarin from the clinic, however there were refills put on her prescription to her pharmacy. Advised calling her pharmacy to request emergency override from insurance.    Ellen Basilio, PharmD  [facility]  7/14/2025 12:36 PM

## 2025-07-14 NOTE — TELEPHONE ENCOUNTER
Keysha:  [phone]    Had a dizzy spell this morning and her heart rate was elevated. Patient states she's [place] out west on [place] morning.     Worried about being de-hydrated. What do you recommend, besides drinking more water, liquids   She keeps stating she has an over active bladder.

## 2025-07-18 ENCOUNTER — TELEPHONE (OUTPATIENT)
Dept: FAMILY MEDICINE CLINIC | Age: 65
End: 2025-07-18

## 2025-07-18 NOTE — TELEPHONE ENCOUNTER
Date patient is due:01/01/25    Outcome of phone call? transferred care    Did patient deny appointment? no    Has a packet mailed or is patient completing on mychart? Patient has new patient appointment scheduled with alternative provider    Virtual visit offered? no

## 2025-07-21 ENCOUNTER — TELEPHONE (OUTPATIENT)
Dept: PHARMACY | Facility: CLINIC | Age: 65
End: 2025-07-21

## 2025-07-21 NOTE — TELEPHONE ENCOUNTER
[facility] CLINICAL PHARMACY: ADHERENCE REVIEW  Identified care gap per [employer]: fills at [facility]: Diabetes adherence    Patient also appears to be prescribed: ACE/ARB    ASSESSMENT    ACE/ARB ADHERENCE    Insurance Records claims through 25 (Prior Year PDC = not reported; YTD PDC = 97%; Potential Fail Date: 10.17.25):   CANDESARTAN  TAB 4MG last filled on 25 for 60 day supply. Next refill due: 25    Prescribed si tablet/capsule daily      BP Readings from Last 3 Encounters:   25 129/64   25 110/68   25 (!) 178/66     Estimated Creatinine Clearance: 49 mL/min (A) (based on SCr of 1 mg/dL (H)).  Lab Results   Component Value Date    CREATININE 1.0 (H) 04/15/2025     Lab Results   Component Value Date    K 4.3 04/15/2025     DIABETES ADHERENCE    Insurance Records claims through 25 (Prior Year PDC = not reported; YTD PDC = 41%; Potential Fail Date: 25):   FARXIGA      TAB 10MG last filled on 25 for   30 day supply. Next refill due:     Prescribed si tablet/capsule daily    Per Insurer Portal: last filled on same      No results found for: \"LABA1C\"  NOTE: A1c not yet completed this     PLAN  Per insurer report, LIS-1 - may be able to receive up to a 100-day supply for the same cost as a 30-day supply.      The following are interventions that have been identified:   Patient overdue refilling FARXIGA      TAB 10MG. Unsure if patient is still prescribed this medication.     Attempting to reach patient to review - unable to leave message.  0617  per chart advised to take Farxiga but has not started. Patient not taking She believes UTI was caused by starting Farxiga, which she has now stopped. Per chart 07.10.25. Routing to see about removing from med list.      Last Visit: 25  Next Visit: none    Rosita Stephens CPhT  [facility]   [facility]  Toll Free: [phone]

## 2025-07-22 ENCOUNTER — TELEPHONE (OUTPATIENT)
Age: 65
End: 2025-07-22

## 2025-07-22 NOTE — TELEPHONE ENCOUNTER
Appears Farxiga currently marked as \"not taking\" - without further patient clarification will leave on list and marked as \"not taking\" as is for now. As noted below, 7/10/25 anticoag encounter reported she thought UTI was caused by Farxiga and she had stopped taking it; however, 7/9/25 urogyn OV also reported patient was going to start Farxiga tomorrow.

## 2025-07-22 NOTE — TELEPHONE ENCOUNTER
Spoke to Keysha via telephone. INR was 5.6 on Sunday via home machine. She held x2 doses. Instructed patient to take 4 mg daily starting today. She will recheck INR tomorrow and call with results. Patient returning from vacation on Saturday. Instructed patient to plan to recheck INR on Monday and call the clinic with results as she is unable to be seen on Monday.

## 2025-07-23 LAB — CYTOLOGY REPORT: NORMAL

## 2025-07-24 ENCOUNTER — TELEPHONE (OUTPATIENT)
Age: 65
End: 2025-07-24

## 2025-07-24 NOTE — TELEPHONE ENCOUNTER
[facility] Medication Management Clinic Note  Patient called reporting INR of 2.1 at home yesterday 7/23. She took warfarin 6 mg at night which would have been her normal home dose. She had called the clinic 7/22 and spoke with Zana Sanders, regarding an elevated home INR reading. Patient is currently on vacation and is not eating her normal diet. Suspect this is sole reason for supratherapeutic INR. Patient's INR goal is 2.5-3.5. With this in mind, advised to resume usual home dose of warfarin 6 mg MWF and 4 mg all other days. Patient's next in-office visit is 8/5, if she checks her INR at home between now and then she will call if it is out of therapeutic range.    Ellen Basilio, PharmD  [facility]  7/24/2025 9:34 AM

## 2025-08-05 ENCOUNTER — ANTI-COAG VISIT (OUTPATIENT)
Age: 65
End: 2025-08-05
Payer: MEDICARE

## 2025-08-05 DIAGNOSIS — Z95.2 HX OF MITRAL VALVE REPLACEMENT WITH MECHANICAL VALVE: Primary | ICD-10-CM

## 2025-08-05 LAB
INTERNATIONAL NORMALIZATION RATIO, POC: 3.8
PROTHROMBIN TIME, POC: 45.9

## 2025-08-05 PROCEDURE — 99213 OFFICE O/P EST LOW 20 MIN: CPT

## 2025-08-05 PROCEDURE — 85610 PROTHROMBIN TIME: CPT

## 2025-08-07 ENCOUNTER — TELEPHONE (OUTPATIENT)
Dept: FAMILY MEDICINE CLINIC | Age: 65
End: 2025-08-07

## 2025-08-12 ENCOUNTER — APPOINTMENT (OUTPATIENT)
Dept: CT IMAGING | Facility: CLINIC | Age: 65
End: 2025-08-12
Payer: MEDICARE

## 2025-08-12 ENCOUNTER — HOSPITAL ENCOUNTER (EMERGENCY)
Facility: CLINIC | Age: 65
Discharge: HOME | End: 2025-08-12
Attending: EMERGENCY MEDICINE
Payer: MEDICARE

## 2025-08-12 VITALS
SYSTOLIC BLOOD PRESSURE: 124 MMHG | RESPIRATION RATE: 15 BRPM | OXYGEN SATURATION: 96 % | TEMPERATURE: 98.2 F | BODY MASS INDEX: 21.16 KG/M2 | DIASTOLIC BLOOD PRESSURE: 58 MMHG | HEART RATE: 56 BPM | WEIGHT: 127 LBS | HEIGHT: 65 IN

## 2025-08-12 DIAGNOSIS — N76.0 GARDNERELLA VAGINITIS: ICD-10-CM

## 2025-08-12 DIAGNOSIS — B96.89 GARDNERELLA VAGINITIS: ICD-10-CM

## 2025-08-12 DIAGNOSIS — R10.32 LEFT LOWER QUADRANT ABDOMINAL PAIN: Primary | ICD-10-CM

## 2025-08-12 LAB
ALBUMIN SERPL-MCNC: 4.2 G/DL (ref 3.5–5.2)
ALBUMIN/GLOB SERPL: 1.4 {RATIO}
ALP SERPL-CCNC: 64 U/L (ref 35–104)
ALT SERPL-CCNC: 23 U/L (ref 10–35)
ANION GAP SERPL CALCULATED.3IONS-SCNC: 12 MMOL/L (ref 9–16)
AST SERPL-CCNC: 29 U/L (ref 10–35)
BASOPHILS # BLD: 0 K/UL (ref 0–0.2)
BASOPHILS NFR BLD: 1 % (ref 0–2)
BILIRUB SERPL-MCNC: 0.7 MG/DL (ref 0–1.2)
BILIRUB UR QL STRIP: NEGATIVE
BUN SERPL-MCNC: 19 MG/DL (ref 8–23)
CALCIUM SERPL-MCNC: 9.9 MG/DL (ref 8.6–10.4)
CANDIDA SPECIES: NEGATIVE
CHLORIDE SERPL-SCNC: 104 MMOL/L (ref 98–107)
CLARITY UR: CLEAR
CO2 SERPL-SCNC: 25 MMOL/L (ref 20–31)
COLOR UR: YELLOW
COMMENT: NORMAL
CREAT SERPL-MCNC: 0.8 MG/DL (ref 0.5–0.9)
EOSINOPHIL # BLD: 0.1 K/UL (ref 0–0.4)
EOSINOPHILS RELATIVE PERCENT: 3 % (ref 1–4)
ERYTHROCYTE [DISTWIDTH] IN BLOOD BY AUTOMATED COUNT: 13.7 % (ref 12.5–15.4)
GARDNERELLA VAGINALIS: POSITIVE
GFR, ESTIMATED: 81 ML/MIN/1.73M2
GLUCOSE SERPL-MCNC: 94 MG/DL (ref 74–99)
GLUCOSE UR STRIP-MCNC: NEGATIVE MG/DL
HCT VFR BLD AUTO: 39.7 % (ref 36–46)
HGB BLD-MCNC: 13.4 G/DL (ref 12–16)
HGB UR QL STRIP.AUTO: NEGATIVE
KETONES UR STRIP-MCNC: NEGATIVE MG/DL
LEUKOCYTE ESTERASE UR QL STRIP: NEGATIVE
LIPASE SERPL-CCNC: 53 U/L (ref 13–60)
LYMPHOCYTES NFR BLD: 1.4 K/UL (ref 1–4.8)
LYMPHOCYTES RELATIVE PERCENT: 24 % (ref 24–44)
MCH RBC QN AUTO: 29.6 PG (ref 26–34)
MCHC RBC AUTO-ENTMCNC: 33.9 G/DL (ref 31–37)
MCV RBC AUTO: 87.4 FL (ref 80–100)
MONOCYTES NFR BLD: 0.6 K/UL (ref 0.1–1.2)
MONOCYTES NFR BLD: 10 % (ref 2–11)
NEUTROPHILS NFR BLD: 62 % (ref 36–66)
NEUTS SEG NFR BLD: 3.6 K/UL (ref 1.8–7.7)
NITRITE UR QL STRIP: NEGATIVE
PH UR STRIP: 7 [PH] (ref 5–8)
PLATELET # BLD AUTO: 226 K/UL (ref 140–450)
PMV BLD AUTO: 8.3 FL (ref 6–12)
POTASSIUM SERPL-SCNC: 3.9 MMOL/L (ref 3.7–5.3)
PROT SERPL-MCNC: 7.3 G/DL (ref 6.6–8.7)
PROT UR STRIP-MCNC: NEGATIVE MG/DL
RBC # BLD AUTO: 4.54 M/UL (ref 4–5.2)
SODIUM SERPL-SCNC: 141 MMOL/L (ref 136–145)
SOURCE: ABNORMAL
SP GR UR STRIP: 1.02 (ref 1–1.03)
TRICHOMONAS: NEGATIVE
UROBILINOGEN UR STRIP-ACNC: NORMAL EU/DL (ref 0–1)
WBC OTHER # BLD: 5.8 K/UL (ref 3.5–11)

## 2025-08-12 PROCEDURE — 85025 COMPLETE CBC W/AUTO DIFF WBC: CPT

## 2025-08-12 PROCEDURE — 99284 EMERGENCY DEPT VISIT MOD MDM: CPT

## 2025-08-12 PROCEDURE — 87591 N.GONORRHOEAE DNA AMP PROB: CPT

## 2025-08-12 PROCEDURE — 83690 ASSAY OF LIPASE: CPT

## 2025-08-12 PROCEDURE — 80053 COMPREHEN METABOLIC PANEL: CPT

## 2025-08-12 PROCEDURE — 87480 CANDIDA DNA DIR PROBE: CPT

## 2025-08-12 PROCEDURE — 81003 URINALYSIS AUTO W/O SCOPE: CPT

## 2025-08-12 PROCEDURE — 74176 CT ABD & PELVIS W/O CONTRAST: CPT

## 2025-08-12 PROCEDURE — 36415 COLL VENOUS BLD VENIPUNCTURE: CPT

## 2025-08-12 PROCEDURE — 87491 CHLMYD TRACH DNA AMP PROBE: CPT

## 2025-08-12 PROCEDURE — 87660 TRICHOMONAS VAGIN DIR PROBE: CPT

## 2025-08-12 PROCEDURE — 87510 GARDNER VAG DNA DIR PROBE: CPT

## 2025-08-12 ASSESSMENT — PAIN - FUNCTIONAL ASSESSMENT
PAIN_FUNCTIONAL_ASSESSMENT: 0-10
PAIN_FUNCTIONAL_ASSESSMENT: 0-10

## 2025-08-12 ASSESSMENT — PAIN DESCRIPTION - ORIENTATION
ORIENTATION: LEFT;LOWER
ORIENTATION: LEFT;LOWER

## 2025-08-12 ASSESSMENT — PAIN DESCRIPTION - PAIN TYPE
TYPE: ACUTE PAIN
TYPE: ACUTE PAIN

## 2025-08-12 ASSESSMENT — PAIN DESCRIPTION - LOCATION
LOCATION: ABDOMEN
LOCATION: ABDOMEN

## 2025-08-12 ASSESSMENT — PAIN DESCRIPTION - FREQUENCY
FREQUENCY: CONTINUOUS
FREQUENCY: CONTINUOUS

## 2025-08-12 ASSESSMENT — PAIN SCALES - GENERAL: PAINLEVEL_OUTOF10: 5

## 2025-08-12 ASSESSMENT — PAIN DESCRIPTION - DESCRIPTORS: DESCRIPTORS: BURNING;SHARP;ACHING

## 2025-08-13 RX ORDER — METRONIDAZOLE 500 MG/1
500 TABLET ORAL 2 TIMES DAILY
Qty: 14 TABLET | Refills: 0 | Status: SHIPPED | OUTPATIENT
Start: 2025-08-13 | End: 2025-08-20

## 2025-08-14 ENCOUNTER — TELEPHONE (OUTPATIENT)
Age: 65
End: 2025-08-14

## 2025-08-14 LAB
C TRACH DNA SPEC QL PROBE+SIG AMP: NEGATIVE
N GONORRHOEA DNA SPEC QL PROBE+SIG AMP: NEGATIVE
SPECIMEN DESCRIPTION: NORMAL

## 2025-08-15 ENCOUNTER — TELEPHONE (OUTPATIENT)
Dept: PHARMACY | Facility: CLINIC | Age: 65
End: 2025-08-15

## 2025-08-20 ENCOUNTER — TELEPHONE (OUTPATIENT)
Dept: FAMILY MEDICINE CLINIC | Age: 65
End: 2025-08-20

## 2025-08-20 DIAGNOSIS — R10.32 LLQ PAIN: Primary | ICD-10-CM

## 2025-08-22 ENCOUNTER — HOSPITAL ENCOUNTER (OUTPATIENT)
Dept: ULTRASOUND IMAGING | Age: 65
Discharge: HOME | End: 2025-08-24
Payer: MEDICARE

## 2025-08-22 DIAGNOSIS — R10.32 LLQ PAIN: ICD-10-CM

## 2025-08-22 PROCEDURE — 76830 TRANSVAGINAL US NON-OB: CPT

## 2025-08-25 DIAGNOSIS — R10.32 LLQ PAIN: Primary | ICD-10-CM

## 2025-08-25 DIAGNOSIS — R30.0 DYSURIA: ICD-10-CM

## 2025-08-25 DIAGNOSIS — N20.0 KIDNEY STONE ON LEFT SIDE: ICD-10-CM

## 2025-08-28 ENCOUNTER — OFFICE VISIT (OUTPATIENT)
Dept: FAMILY MEDICINE CLINIC | Age: 65
End: 2025-08-28

## 2025-08-28 VITALS
HEART RATE: 55 BPM | OXYGEN SATURATION: 97 % | SYSTOLIC BLOOD PRESSURE: 118 MMHG | BODY MASS INDEX: 21.79 KG/M2 | HEIGHT: 65 IN | WEIGHT: 130.8 LBS | DIASTOLIC BLOOD PRESSURE: 60 MMHG

## 2025-08-28 DIAGNOSIS — N18.2 STAGE 2 CHRONIC KIDNEY DISEASE: ICD-10-CM

## 2025-08-28 DIAGNOSIS — E78.2 MIXED HYPERLIPIDEMIA: ICD-10-CM

## 2025-08-28 DIAGNOSIS — Z12.11 SCREEN FOR COLON CANCER: ICD-10-CM

## 2025-08-28 DIAGNOSIS — R73.9 HYPERGLYCEMIA: ICD-10-CM

## 2025-08-28 DIAGNOSIS — Z12.31 ENCOUNTER FOR SCREENING MAMMOGRAM FOR MALIGNANT NEOPLASM OF BREAST: ICD-10-CM

## 2025-08-28 DIAGNOSIS — E53.8 VITAMIN B 12 DEFICIENCY: ICD-10-CM

## 2025-08-28 DIAGNOSIS — Z76.89 ENCOUNTER TO ESTABLISH CARE: Primary | ICD-10-CM

## 2025-08-28 DIAGNOSIS — Z78.0 POSTMENOPAUSAL: ICD-10-CM

## 2025-08-28 DIAGNOSIS — E55.9 VITAMIN D DEFICIENCY: ICD-10-CM

## 2025-08-28 DIAGNOSIS — E03.9 BORDERLINE HYPOTHYROIDISM: ICD-10-CM

## 2025-08-28 PROBLEM — R94.39 ABNORMAL STRESS TEST: Status: RESOLVED | Noted: 2024-09-27 | Resolved: 2025-08-28

## 2025-08-28 PROBLEM — R42 LIGHTHEADEDNESS: Status: RESOLVED | Noted: 2023-08-30 | Resolved: 2025-08-28

## 2025-08-28 ASSESSMENT — ENCOUNTER SYMPTOMS
EYE DISCHARGE: 0
NAUSEA: 0
CHEST TIGHTNESS: 0
VOMITING: 0
WHEEZING: 0
SHORTNESS OF BREATH: 0
ABDOMINAL PAIN: 0
SORE THROAT: 0
CONSTIPATION: 0
COUGH: 0
DIARRHEA: 0

## 2025-09-04 ENCOUNTER — ANTI-COAG VISIT (OUTPATIENT)
Age: 65
End: 2025-09-04
Payer: MEDICARE

## 2025-09-04 DIAGNOSIS — Z95.2 HX OF MITRAL VALVE REPLACEMENT WITH MECHANICAL VALVE: Primary | ICD-10-CM

## 2025-09-04 LAB
INTERNATIONAL NORMALIZATION RATIO, POC: 2.9
PROTHROMBIN TIME, POC: 34.6

## 2025-09-04 PROCEDURE — 85610 PROTHROMBIN TIME: CPT

## 2025-09-04 PROCEDURE — 99211 OFF/OP EST MAY X REQ PHY/QHP: CPT

## (undated) DEVICE — ANGIOGRAPHIC CATHETER: Brand: EXPO™

## (undated) DEVICE — TRAY SURG CUST CRD CATH SVMMC SAH PH

## (undated) DEVICE — CATHETER 5FR JR4 CORDIS 100CM

## (undated) DEVICE — ANGIOGRAPHIC CATHETER: Brand: IMPULSE™